# Patient Record
Sex: FEMALE | Race: WHITE | Employment: OTHER | URBAN - METROPOLITAN AREA
[De-identification: names, ages, dates, MRNs, and addresses within clinical notes are randomized per-mention and may not be internally consistent; named-entity substitution may affect disease eponyms.]

---

## 2017-01-02 ENCOUNTER — OFFICE VISIT (OUTPATIENT)
Dept: SLEEP CENTER | Facility: HOSPITAL | Age: 57
End: 2017-01-02
Attending: INTERNAL MEDICINE
Payer: MEDICAID

## 2017-01-02 PROCEDURE — 95810 POLYSOM 6/> YRS 4/> PARAM: CPT

## 2017-01-04 ENCOUNTER — OFFICE VISIT (OUTPATIENT)
Dept: NEUROLOGY | Facility: CLINIC | Age: 57
End: 2017-01-04

## 2017-01-04 VITALS
DIASTOLIC BLOOD PRESSURE: 80 MMHG | RESPIRATION RATE: 20 BRPM | BODY MASS INDEX: 41.95 KG/M2 | WEIGHT: 293 LBS | SYSTOLIC BLOOD PRESSURE: 120 MMHG | HEIGHT: 70 IN | HEART RATE: 100 BPM

## 2017-01-04 DIAGNOSIS — M79.7 FIBROMYALGIA: ICD-10-CM

## 2017-01-04 DIAGNOSIS — G62.9 PERIPHERAL POLYNEUROPATHY: Primary | ICD-10-CM

## 2017-01-04 DIAGNOSIS — G25.81 RESTLESS LEG SYNDROME: ICD-10-CM

## 2017-01-04 PROCEDURE — 99213 OFFICE O/P EST LOW 20 MIN: CPT | Performed by: OTHER

## 2017-01-04 RX ORDER — MELOXICAM 15 MG/1
TABLET ORAL
Refills: 0 | COMMUNITY
Start: 2016-11-14 | End: 2017-09-17

## 2017-01-04 RX ORDER — HYDROXYCHLOROQUINE SULFATE 200 MG/1
TABLET, FILM COATED ORAL
Refills: 0 | COMMUNITY
Start: 2016-10-29 | End: 2017-08-28 | Stop reason: ALTCHOICE

## 2017-01-04 NOTE — PROGRESS NOTES
HPI:    Patient ID: Eleonora Blas is a 49year old female. Numbness  Associated symptoms include numbness. Patient presented for follow-up for peripheral sensory neuropathy, carpal tunnel syndrome and restless leg syndrome.  She states h every evening.  Disp:  Rfl:      Allergies:  Amoxicillin             Anaphylaxis  Penicillins             Anaphylaxis  Adhesive Tape               Comment:Except paper tape and surgical tape   PHYSICAL EXAM:   Physical Exam     Blood pressure 120/80, pulse

## 2017-01-04 NOTE — PATIENT INSTRUCTIONS
Refill policies:    • Allow 2 business days for refills; controlled substances may take longer.   • Contact your pharmacy at least 5 days prior to running out of medication and have them send an electronic request or submit request through the “request re your physician has recommended that you have a procedure or additional testing performed. [facility]) will contact your insurance carrier to obtain pre-certification or prior authorization.     Unfortunately, NIGHAT has seen an increas

## 2017-01-06 NOTE — PROCEDURES
[address] [address],[facility] [address]       Accredited by the [facility] of Sleep Medicine (AASM)    PATIENT'S NAME:        Daxa Rodriguez, [address] PHYSICIAN:   Nneka Chowdary M.D.   REFERRING PHYSICIAN:   Yesenia Castellanos stage 1 or light sleep was seen in normal amounts comprising 5.9% of sleep. Slow-wave sleep was seen in normal amounts comprising 19% of sleep. REM sleep was seen in increased amounts comprising 34.8% of sleep.     RESPIRATORY MEASURES:  Respiratory monit

## 2017-02-13 DIAGNOSIS — M79.7 FIBROMYALGIA: Primary | ICD-10-CM

## 2017-02-13 NOTE — TELEPHONE ENCOUNTER
Medication: Gapabentin 400mg     Date of last refill: 8/16/16  [place]     Last office visit: 1/4/17  Due back to clinic per last office note:  6 months  Date next office visit scheduled:  No appointment    Last OV note

## 2017-02-14 RX ORDER — GABAPENTIN 400 MG/1
CAPSULE ORAL
Qty: 120 CAPSULE | Refills: 4 | Status: SHIPPED | OUTPATIENT
Start: 2017-02-14 | End: 2017-07-20

## 2017-03-02 ENCOUNTER — TELEPHONE (OUTPATIENT)
Dept: FAMILY MEDICINE CLINIC | Facility: CLINIC | Age: 57
End: 2017-03-02

## 2017-03-24 RX ORDER — DIAZEPAM 5 MG/1
TABLET ORAL
Qty: 2 TABLET | Refills: 0 | Status: SHIPPED | OUTPATIENT
Start: 2017-03-24 | End: 2017-08-28 | Stop reason: ALTCHOICE

## 2017-04-17 RX ORDER — PROPRANOLOL HYDROCHLORIDE 60 MG/1
TABLET ORAL
Qty: 180 TABLET | Refills: 1 | Status: SHIPPED | OUTPATIENT
Start: 2017-04-17 | End: 2017-10-19

## 2017-06-16 ENCOUNTER — MED REC SCAN ONLY (OUTPATIENT)
Dept: FAMILY MEDICINE CLINIC | Facility: CLINIC | Age: 57
End: 2017-06-16

## 2017-06-20 ENCOUNTER — TELEPHONE (OUTPATIENT)
Dept: NEUROLOGY | Facility: CLINIC | Age: 57
End: 2017-06-20

## 2017-06-20 NOTE — TELEPHONE ENCOUNTER
Rec'd incoming fax from [facility] dated 6/19/17 notifying office of approval of PA for Gabapentin Cap 400mg, effective 06/18/2017 thorough 06/18/2018.   [facility] #[id number]; [place] #[id number]; Qty/Days supply approved:

## 2017-06-21 ENCOUNTER — TELEPHONE (OUTPATIENT)
Dept: FAMILY MEDICINE CLINIC | Facility: CLINIC | Age: 57
End: 2017-06-21

## 2017-06-21 DIAGNOSIS — M15.9 PRIMARY OSTEOARTHRITIS INVOLVING MULTIPLE JOINTS: Primary | ICD-10-CM

## 2017-06-21 NOTE — TELEPHONE ENCOUNTER
Noted, orders were received. Original copy in [employer]. Orders entered into Epic. Results to be faxed to [phone].

## 2017-06-27 ENCOUNTER — NURSE ONLY (OUTPATIENT)
Dept: FAMILY MEDICINE CLINIC | Facility: CLINIC | Age: 57
End: 2017-06-27

## 2017-06-27 DIAGNOSIS — M15.9 PRIMARY OSTEOARTHRITIS INVOLVING MULTIPLE JOINTS: ICD-10-CM

## 2017-06-27 PROCEDURE — 84460 ALANINE AMINO (ALT) (SGPT): CPT | Performed by: FAMILY MEDICINE

## 2017-06-27 PROCEDURE — 82565 ASSAY OF CREATININE: CPT | Performed by: FAMILY MEDICINE

## 2017-06-27 PROCEDURE — 85025 COMPLETE CBC W/AUTO DIFF WBC: CPT | Performed by: FAMILY MEDICINE

## 2017-06-27 PROCEDURE — 84450 TRANSFERASE (AST) (SGOT): CPT | Performed by: FAMILY MEDICINE

## 2017-06-27 NOTE — PROGRESS NOTES
Mint and Lav tubes drawn from left arm with straight needle x1.  Pt cinthia well    Pt had thought that there was supposed to be a thyroid lab ordered I explained to her that we didn't get that- she states that it was supposed to come from [facility]- advised that

## 2017-07-20 DIAGNOSIS — M79.7 FIBROMYALGIA: ICD-10-CM

## 2017-07-20 RX ORDER — GABAPENTIN 400 MG/1
CAPSULE ORAL
Qty: 120 CAPSULE | Refills: 0 | Status: SHIPPED | OUTPATIENT
Start: 2017-07-20 | End: 2017-08-23

## 2017-07-20 NOTE — TELEPHONE ENCOUNTER
Patient informed  Needs appointment for further refills.   Medication: Gabapentin    Date of last refill: 2/14/17  [place]    Last office visit: 1/4/17  Due back to clinic per last office note:  6 months  Date next off

## 2017-08-08 ENCOUNTER — TELEPHONE (OUTPATIENT)
Dept: FAMILY MEDICINE CLINIC | Facility: CLINIC | Age: 57
End: 2017-08-08

## 2017-08-08 DIAGNOSIS — M25.561 PAIN IN BOTH KNEES, UNSPECIFIED CHRONICITY: ICD-10-CM

## 2017-08-08 DIAGNOSIS — M25.562 PAIN IN BOTH KNEES, UNSPECIFIED CHRONICITY: ICD-10-CM

## 2017-08-08 DIAGNOSIS — G56.00 CARPAL TUNNEL SYNDROME, UNSPECIFIED LATERALITY: Primary | ICD-10-CM

## 2017-08-08 NOTE — TELEPHONE ENCOUNTER
Pt needs referrals for Ortho, Virgil Thorne for hands. and Ortho, Dr. Colonel Browne for knee.  Will be having surgery on both

## 2017-08-08 NOTE — TELEPHONE ENCOUNTER
Would need an office visit to order testing, but it sounds like she is seeing ortho already for her knee-MRI orders or further testing would come from them.  Referrals signed

## 2017-08-08 NOTE — TELEPHONE ENCOUNTER
Pt needs referrals for the specialists- she is also requesting an MRI if the right knee. I asked if this something that she spoke with Dr. Serena Garcia about and she states that they talked about it for her social security paperwork a couple years ago.   Advised th

## 2017-08-09 NOTE — TELEPHONE ENCOUNTER
Referrals faxed,  Pt advised that the referrals have been faxed and that she needs an appt for the MRI or Dr. Verenice Doran can order it.  She v/u

## 2017-08-23 DIAGNOSIS — M79.7 FIBROMYALGIA: ICD-10-CM

## 2017-08-23 NOTE — TELEPHONE ENCOUNTER
Medication: Gabapentin 400 mg     Date of last refill: 7/20/17  [place]     Last office visit: 1/4/17  Due back to clinic per last office note:  6 months  Date next office visit scheduled:  8/29/17     Last OV note

## 2017-08-24 RX ORDER — GABAPENTIN 400 MG/1
CAPSULE ORAL
Qty: 120 CAPSULE | Refills: 0 | Status: SHIPPED | OUTPATIENT
Start: 2017-08-24 | End: 2017-09-24

## 2017-08-28 ENCOUNTER — OFFICE VISIT (OUTPATIENT)
Dept: FAMILY MEDICINE CLINIC | Facility: CLINIC | Age: 57
End: 2017-08-28

## 2017-08-28 ENCOUNTER — TELEPHONE (OUTPATIENT)
Dept: FAMILY MEDICINE CLINIC | Facility: CLINIC | Age: 57
End: 2017-08-28

## 2017-08-28 ENCOUNTER — TELEPHONE (OUTPATIENT)
Dept: NEUROLOGY | Facility: CLINIC | Age: 57
End: 2017-08-28

## 2017-08-28 VITALS
HEART RATE: 76 BPM | DIASTOLIC BLOOD PRESSURE: 64 MMHG | BODY MASS INDEX: 45 KG/M2 | TEMPERATURE: 97 F | RESPIRATION RATE: 12 BRPM | WEIGHT: 293 LBS | SYSTOLIC BLOOD PRESSURE: 100 MMHG

## 2017-08-28 DIAGNOSIS — L30.8 OTHER ECZEMA: ICD-10-CM

## 2017-08-28 DIAGNOSIS — M25.562 CHRONIC PAIN OF LEFT KNEE: ICD-10-CM

## 2017-08-28 DIAGNOSIS — F43.23 ADJUSTMENT DISORDER WITH MIXED ANXIETY AND DEPRESSED MOOD: ICD-10-CM

## 2017-08-28 DIAGNOSIS — M79.7 FIBROMYALGIA: ICD-10-CM

## 2017-08-28 DIAGNOSIS — Z13.220 LIPID SCREENING: ICD-10-CM

## 2017-08-28 DIAGNOSIS — F43.12 CHRONIC POST-TRAUMATIC STRESS DISORDER (PTSD): ICD-10-CM

## 2017-08-28 DIAGNOSIS — N83.201 CYSTS OF BOTH OVARIES: ICD-10-CM

## 2017-08-28 DIAGNOSIS — R73.9 ELEVATED BLOOD SUGAR: ICD-10-CM

## 2017-08-28 DIAGNOSIS — I34.1 MITRAL VALVE PROLAPSE: ICD-10-CM

## 2017-08-28 DIAGNOSIS — Z13.29 THYROID DISORDER SCREEN: ICD-10-CM

## 2017-08-28 DIAGNOSIS — M47.812 OSTEOARTHRITIS OF CERVICAL SPINE, UNSPECIFIED SPINAL OSTEOARTHRITIS COMPLICATION STATUS: ICD-10-CM

## 2017-08-28 DIAGNOSIS — E55.9 VITAMIN D DEFICIENCY: ICD-10-CM

## 2017-08-28 DIAGNOSIS — M51.36 DDD (DEGENERATIVE DISC DISEASE), LUMBAR: ICD-10-CM

## 2017-08-28 DIAGNOSIS — Z12.31 ENCOUNTER FOR SCREENING MAMMOGRAM FOR BREAST CANCER: ICD-10-CM

## 2017-08-28 DIAGNOSIS — N83.202 CYSTS OF BOTH OVARIES: ICD-10-CM

## 2017-08-28 DIAGNOSIS — Z00.00 ENCOUNTER FOR ANNUAL HEALTH EXAMINATION: Primary | ICD-10-CM

## 2017-08-28 DIAGNOSIS — G89.29 CHRONIC PAIN OF LEFT KNEE: ICD-10-CM

## 2017-08-28 DIAGNOSIS — E53.8 VITAMIN B12 DEFICIENCY: ICD-10-CM

## 2017-08-28 DIAGNOSIS — G56.03 BILATERAL CARPAL TUNNEL SYNDROME: ICD-10-CM

## 2017-08-28 PROCEDURE — 82306 VITAMIN D 25 HYDROXY: CPT | Performed by: FAMILY MEDICINE

## 2017-08-28 PROCEDURE — 80048 BASIC METABOLIC PNL TOTAL CA: CPT | Performed by: FAMILY MEDICINE

## 2017-08-28 PROCEDURE — 84439 ASSAY OF FREE THYROXINE: CPT | Performed by: FAMILY MEDICINE

## 2017-08-28 PROCEDURE — 83036 HEMOGLOBIN GLYCOSYLATED A1C: CPT | Performed by: FAMILY MEDICINE

## 2017-08-28 PROCEDURE — G0402 INITIAL PREVENTIVE EXAM: HCPCS | Performed by: FAMILY MEDICINE

## 2017-08-28 PROCEDURE — 84443 ASSAY THYROID STIM HORMONE: CPT | Performed by: FAMILY MEDICINE

## 2017-08-28 PROCEDURE — 82607 VITAMIN B-12: CPT | Performed by: FAMILY MEDICINE

## 2017-08-28 PROCEDURE — 80061 LIPID PANEL: CPT | Performed by: FAMILY MEDICINE

## 2017-08-28 NOTE — TELEPHONE ENCOUNTER
Patient called at 3:50 pm and advised that she is stuck in traffic and will be about 10 min late. Advised that she is already 10 min late. Patient then stated that she is [facility] and will be here in 10 minutes.  ADvised that I will let RN know but we demar

## 2017-08-28 NOTE — PROGRESS NOTES
HPI:   Troy Everett is a 56year old female who presents for a Medicare Subsequent Annual Wellness visit (Pt already had Initial Annual Wellness). Dr. Dominick Manriquez is her neurologist for neuropathy.   Dr. Madison Lewis is her rheumatologist (also lumbar     Carpal tunnel syndrome     Chronic pain of left knee      Last Cholesterol Labs:     Lab Results  Component Value Date   CHOLEST 192 08/28/2017   HDL 43 (L) 08/28/2017    08/28/2017   TRIG 184 (H) 08/28/2017          Last Chemistry Labs: surgery; cholecystectomy; lumpectomy left; lumpectomy left; and lumpectomy right. Her family history includes Breast Cancer in her mother. SOCIAL HISTORY:   She  reports that she quit smoking about 10 years ago.  She has never used smokeless tobacco. S normal, no drainage or sinus tenderness   Throat: Lips, mucosa, and tongue normal; teeth and gums normal   Neck: Supple, symmetrical, trachea midline, no adenopathy;  thyroid: not enlarged, symmetric, no tenderness/mass/nodules; no carotid bruit or JVD breast cancer  -     WILFERDO SCREENING BILAT (CPT=77067); Future    Cysts of both ovaries  Comments:  history of cysts, looking to see if resolved  Orders:  -     US PELVIS EV (TRNS ABD AND ENDOVAG) (BRU=[id number]);  Future    Fibromyalgia  -cont f/u with rhe Pneumococcal?: No     Functional Ability     Bathing or Showering: Able without help    Toileting: Able without help    Dressing: Able without help    Eating: Able without help    Driving: Able without help    Preparing your meals: Able without help    Man sheet to patient  PREVENTATIVE SERVICES  INDICATIONS AND SCHEDULE Internal Lab or Procedure External Lab or Procedure   Diabetes Screening      HbgA1C   Annually   Lab Results  Component Value Date   A1C 6.2 (H) 08/28/2017    No flowsheet data found.     Fa found Please get once after your 65th birthday    Pneumococcal 23 (Pneumovax)  Covered Once after 65 No vaccine history found Please get once after your 65th birthday    Hepatitis B for Moderate/High Risk No vaccine history found Medium/high risk factors: flowsheet data found. No flowsheet data found. COPD      Spirometry Testing Annually Spirometry date:  No flowsheet data found.          Template: LEATHA BLANCO MEDICARE ANNUAL ASSESSMENT FEMALE [75197]

## 2017-08-29 ENCOUNTER — TELEPHONE (OUTPATIENT)
Dept: FAMILY MEDICINE CLINIC | Facility: CLINIC | Age: 57
End: 2017-08-29

## 2017-08-29 LAB
25-HYDROXYVITAMIN D (TOTAL): 13.6 NG/ML (ref 30–100)
BUN BLD-MCNC: 22 MG/DL (ref 8–20)
CALCIUM BLD-MCNC: 9.2 MG/DL (ref 8.3–10.3)
CHLORIDE: 107 MMOL/L (ref 101–111)
CHOLEST SMN-MCNC: 192 MG/DL (ref ?–200)
CO2: 30 MMOL/L (ref 22–32)
CREAT BLD-MCNC: 1.04 MG/DL (ref 0.55–1.02)
EST. AVERAGE GLUCOSE BLD GHB EST-MCNC: 131 MG/DL (ref 68–126)
FREE T4: 0.8 NG/DL (ref 0.9–1.8)
GLUCOSE BLD-MCNC: 85 MG/DL (ref 70–99)
HAV AB SERPL IA-ACNC: >2000 PG/ML (ref 193–986)
HBA1C MFR BLD HPLC: 6.2 % (ref ?–5.7)
HDLC SERPL-MCNC: 43 MG/DL (ref 45–?)
HDLC SERPL: 4.47 {RATIO} (ref ?–4.44)
LDLC SERPL CALC-MCNC: 112 MG/DL (ref ?–130)
LDLC SERPL-MCNC: 37 MG/DL (ref 5–40)
NONHDLC SERPL-MCNC: 149 MG/DL (ref ?–130)
POTASSIUM SERPL-SCNC: 5.2 MMOL/L (ref 3.6–5.1)
SODIUM SERPL-SCNC: 140 MMOL/L (ref 136–144)
TRIGLYCERIDES: 184 MG/DL (ref ?–150)
TSI SER-ACNC: 3.33 MIU/ML (ref 0.35–5.5)

## 2017-08-29 NOTE — TELEPHONE ENCOUNTER
Called the sleep lab and asked about this pt and who would be calling the pt to follow up for the additional testing. The  states that it would be the pulmonologist that would arrange the additional testing.     I called Dr. Delaney Jain office and

## 2017-08-29 NOTE — TELEPHONE ENCOUNTER
----- Message from Genny Walker MD sent at 8/28/2017  5:18 PM CDT -----  Regarding: sleep study f/u testing  Pt told me in our office visit that she had a sleep study ordered by her neuro and never got the results.  I was able to look it up and it showed s

## 2017-08-30 ENCOUNTER — TELEPHONE (OUTPATIENT)
Dept: FAMILY MEDICINE CLINIC | Facility: CLINIC | Age: 57
End: 2017-08-30

## 2017-08-30 NOTE — TELEPHONE ENCOUNTER
----- Message from Mildred Mitchell MD sent at 8/30/2017  7:23 AM CDT -----  Please notify pt her cholesterol, thyroid, blood counts, kidneys, liver, electrolytes all look good.  Her blood sugar is still in the prediabetic range but stable over the past few ye

## 2017-08-31 PROBLEM — G89.29 CHRONIC PAIN OF LEFT KNEE: Status: ACTIVE | Noted: 2017-08-31

## 2017-08-31 PROBLEM — M25.562 CHRONIC PAIN OF LEFT KNEE: Status: ACTIVE | Noted: 2017-08-31

## 2017-09-01 ENCOUNTER — TELEPHONE (OUTPATIENT)
Dept: FAMILY MEDICINE CLINIC | Facility: CLINIC | Age: 57
End: 2017-09-01

## 2017-09-01 NOTE — TELEPHONE ENCOUNTER
Can I assume this means he was able to visualize the area where the fracture was and it is healed? Or does this mean he was unable to visualize that area at all so unable to compare?  IF he visualized the area and fracture healed then please notify pt of th

## 2017-09-01 NOTE — TELEPHONE ENCOUNTER
Called Radiology at [facility] to see if Dr. Yao Leahy could compare the MRI from 6/13/13 to the one from 3/27/17 to see if he can see the L proximal fibula fracture.   Spoke with Marina Anderson and she had Dr. Yao Leahy compare the 2 and he can not see the fracture on the MRI

## 2017-09-02 NOTE — TELEPHONE ENCOUNTER
please notify pt of this, that we don't need to do any further imaging for that fracture (pt had asked me about repeat imaging to make sure the fracture was healed) thanks

## 2017-09-02 NOTE — TELEPHONE ENCOUNTER
Yasemin Engel at [facility]. Transferred to Dr. Ina Garcia. States that he did see area where fracture was and it is healed.

## 2017-09-05 ENCOUNTER — MED REC SCAN ONLY (OUTPATIENT)
Dept: FAMILY MEDICINE CLINIC | Facility: CLINIC | Age: 57
End: 2017-09-05

## 2017-09-15 ENCOUNTER — TELEPHONE (OUTPATIENT)
Dept: FAMILY MEDICINE CLINIC | Facility: CLINIC | Age: 57
End: 2017-09-15

## 2017-09-15 NOTE — TELEPHONE ENCOUNTER
LRF we have not filled this for the pt-I called the pt and she states that this was prescribed by kiki grijalva-the pt states that you wanted to take over prescribing the amitriptyline and the meloxicam and she wasn't sure if you were going to order the trama

## 2017-09-15 NOTE — TELEPHONE ENCOUNTER
Pt is now taking 2 AMITRIPTYLINE HCL a day and needs a refill.    [facility] [address] [facility], [address] [facility] [address] [place] [address] Drive, [phone], [phone]

## 2017-09-17 RX ORDER — MELOXICAM 15 MG/1
TABLET ORAL
Qty: 30 TABLET | Refills: 6 | Status: SHIPPED | OUTPATIENT
Start: 2017-09-17 | End: 2018-04-11

## 2017-09-18 NOTE — TELEPHONE ENCOUNTER
I can fill the amitriptyline and meloxicam. I sent the meloxicam but can you please clarify, is she taking 2 of the 50mg amitripytline qhs or just 1?  Thanks

## 2017-09-22 RX ORDER — AMITRIPTYLINE HYDROCHLORIDE 100 MG/1
TABLET, FILM COATED ORAL
Qty: 45 TABLET | Refills: 1 | Status: SHIPPED | OUTPATIENT
Start: 2017-09-22 | End: 2017-11-13

## 2017-09-22 NOTE — TELEPHONE ENCOUNTER
Pt called back and she is taking 2 of the amitriptyline at bedtime- she states that this dose is not helping her much- it is still taking her a long time to fall asleep

## 2017-09-22 NOTE — TELEPHONE ENCOUNTER
So she's taking 100mg qhs, 2 of the 50mg pill?  If so, and she's not having side effects, up it to 150mg nightly and call in a week with an update

## 2017-09-24 DIAGNOSIS — M79.7 FIBROMYALGIA: ICD-10-CM

## 2017-09-25 RX ORDER — GABAPENTIN 400 MG/1
CAPSULE ORAL
Qty: 120 CAPSULE | Refills: 0 | Status: SHIPPED | OUTPATIENT
Start: 2017-09-25 | End: 2017-10-22

## 2017-10-19 RX ORDER — PROPRANOLOL HYDROCHLORIDE 60 MG/1
TABLET ORAL
Qty: 180 TABLET | Refills: 3 | Status: SHIPPED | OUTPATIENT
Start: 2017-10-19 | End: 2018-11-16

## 2017-10-22 DIAGNOSIS — M79.7 FIBROMYALGIA: ICD-10-CM

## 2017-10-23 ENCOUNTER — TELEPHONE (OUTPATIENT)
Dept: FAMILY MEDICINE CLINIC | Facility: CLINIC | Age: 57
End: 2017-10-23

## 2017-10-23 NOTE — TELEPHONE ENCOUNTER
Fax request for tramadol 50mg     oxana PEPE this has been filled by another provider- did you discuss that you would fill this for her?

## 2017-10-23 NOTE — TELEPHONE ENCOUNTER
I don't think I've been managing her pain, I think that's been ortho and/or pain doc?  She needs to stick with one doc

## 2017-10-23 NOTE — TELEPHONE ENCOUNTER
Called the pt and she states that you told her that you would manage the tramaadol because hernán didn't want to manage it anymore

## 2017-10-24 RX ORDER — GABAPENTIN 400 MG/1
CAPSULE ORAL
Qty: 120 CAPSULE | Refills: 0 | Status: SHIPPED | OUTPATIENT
Start: 2017-10-24 | End: 2017-11-21

## 2017-10-24 NOTE — TELEPHONE ENCOUNTER
Okay, but what conditions is she treating patient for, what diagnoses?  Need to know what to follow/manage thanks

## 2017-10-24 NOTE — TELEPHONE ENCOUNTER
Can you please call her office to get the scoop on this--what she was treating, if there was a concern with the tramadol. ..thanks

## 2017-10-24 NOTE — TELEPHONE ENCOUNTER
Called Dr. Darreld Gitelman office [phone]  jerrod and spoke with Koki Tapia and asked about the tramadol - she states that Dr. Kg Atkins is no longer prescribing the tramadol at all -this is an office policy.

## 2017-10-27 ENCOUNTER — OFFICE VISIT (OUTPATIENT)
Dept: NEUROLOGY | Facility: CLINIC | Age: 57
End: 2017-10-27

## 2017-10-27 VITALS — DIASTOLIC BLOOD PRESSURE: 76 MMHG | HEART RATE: 78 BPM | SYSTOLIC BLOOD PRESSURE: 124 MMHG

## 2017-10-27 DIAGNOSIS — G25.81 RESTLESS LEG SYNDROME: ICD-10-CM

## 2017-10-27 DIAGNOSIS — G62.9 PERIPHERAL POLYNEUROPATHY: ICD-10-CM

## 2017-10-27 DIAGNOSIS — G47.33 OBSTRUCTIVE SLEEP APNEA SYNDROME: ICD-10-CM

## 2017-10-27 DIAGNOSIS — S09.90XA TRAUMATIC INJURY OF HEAD, INITIAL ENCOUNTER: Primary | ICD-10-CM

## 2017-10-27 PROCEDURE — 99213 OFFICE O/P EST LOW 20 MIN: CPT | Performed by: OTHER

## 2017-10-27 RX ORDER — TRAMADOL HYDROCHLORIDE 50 MG/1
50 TABLET ORAL EVERY 6 HOURS PRN
COMMUNITY
End: 2017-10-30

## 2017-10-27 RX ORDER — CYCLOBENZAPRINE HCL 10 MG
10 TABLET ORAL 2 TIMES DAILY
COMMUNITY
End: 2018-05-14
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
## 2017-10-27 NOTE — PATIENT INSTRUCTIONS
Refill policies:    • Allow 2-3 business days for refills; controlled substances may take longer.   • Contact your pharmacy at least 5 days prior to running out of medication and have them send an electronic request or submit request through the [facility] have a procedure or additional testing performed. [facility]) will contact your insurance carrier to obtain pre-certification or prior authorization.     Unfortunately, NIGHAT has seen an increase in denial of payment even though the p

## 2017-10-30 RX ORDER — TRAMADOL HYDROCHLORIDE 50 MG/1
50 TABLET ORAL 2 TIMES DAILY PRN
Qty: 60 TABLET | Refills: 0 | Status: SHIPPED
Start: 2017-10-30 | End: 2017-12-16

## 2017-10-30 NOTE — TELEPHONE ENCOUNTER
Called and spoke to [place] all[place] at Dr. Lea Pain office she states they do not prescribe this medication for long term use anymore. Last refill was 8/23/2017 #90 with no refills.   Patient was being treated for Osteoarthritis of both knees and also osteoarthritis o

## 2017-10-30 NOTE — PROGRESS NOTES
HPI:    Patient ID: Macarena Martell is a 56year old female. HPI    Patient presented for follow-up for peripheral sensory neuropathy, carpal tunnel syndrome and restless leg syndrome. States symptoms are getting worse.  She is taking medicatio Alcohol use: Yes           0.0 oz/week     Comment: occasional         Review of Systems   Constitutional: Negative. HENT: Negative. Eyes: Negative. Respiratory: Negative. Cardiovascular: Negative. Gastrointestinal: Negative.     Apolinar Lay Anaphylaxis  Adhesive Tape               Comment:Except paper tape and surgical tape  Cephalosporins          Hives  Codeine                 Hives   PHYSICAL EXAM:   Physical Exam    Blood pressure 124/76, pulse 78.       General: well developed, well cristian this encounter       Imaging & Referrals:  CT BRAIN OR HEAD ([id number])       LEEROY#[id number]

## 2017-11-11 ENCOUNTER — TELEPHONE (OUTPATIENT)
Dept: FAMILY MEDICINE CLINIC | Facility: CLINIC | Age: 57
End: 2017-11-11

## 2017-11-11 NOTE — TELEPHONE ENCOUNTER
Patient is having a CT Scan done on Monday and wants to know if [facility] will prescribe something for her to relax her because she has to go into the tube.      [facility] [place] ON [address].

## 2017-11-11 NOTE — TELEPHONE ENCOUNTER
Patient notified that CT scan is open as previous MRI was when she was\"in the tube. \"  Patient verbalized understanding.

## 2017-11-13 RX ORDER — AMITRIPTYLINE HYDROCHLORIDE 100 MG/1
TABLET, FILM COATED ORAL
Qty: 45 TABLET | Refills: 1 | Status: SHIPPED | OUTPATIENT
Start: 2017-11-13 | End: 2018-05-13

## 2017-11-21 DIAGNOSIS — M79.7 FIBROMYALGIA: ICD-10-CM

## 2017-11-21 RX ORDER — GABAPENTIN 400 MG/1
CAPSULE ORAL
Qty: 120 CAPSULE | Refills: 0 | Status: SHIPPED | OUTPATIENT
Start: 2017-11-21 | End: 2017-12-18

## 2017-11-24 ENCOUNTER — HOSPITAL ENCOUNTER (OUTPATIENT)
Dept: CT IMAGING | Age: 57
Discharge: HOME | End: 2017-11-24
Attending: Other
Payer: MEDICARE

## 2017-11-24 ENCOUNTER — HOSPITAL ENCOUNTER (OUTPATIENT)
Dept: CV DIAGNOSTICS | Facility: HOSPITAL | Age: 57
End: 2017-11-24
Attending: FAMILY MEDICINE
Payer: MEDICARE

## 2017-11-24 DIAGNOSIS — S09.90XA TRAUMATIC INJURY OF HEAD, INITIAL ENCOUNTER: ICD-10-CM

## 2017-11-24 PROCEDURE — 70450 CT HEAD/BRAIN W/O DYE: CPT | Performed by: OTHER

## 2017-11-27 ENCOUNTER — HOSPITAL ENCOUNTER (OUTPATIENT)
Dept: MAMMOGRAPHY | Age: 57
Discharge: HOME | End: 2017-11-27
Attending: FAMILY MEDICINE
Payer: MEDICARE

## 2017-11-27 DIAGNOSIS — Z12.31 ENCOUNTER FOR SCREENING MAMMOGRAM FOR BREAST CANCER: ICD-10-CM

## 2017-11-27 PROCEDURE — 77067 SCR MAMMO BI INCL CAD: CPT | Performed by: FAMILY MEDICINE

## 2017-11-29 ENCOUNTER — TELEPHONE (OUTPATIENT)
Dept: NEUROLOGY | Facility: CLINIC | Age: 57
End: 2017-11-29

## 2017-11-29 ENCOUNTER — HOSPITAL ENCOUNTER (OUTPATIENT)
Dept: ULTRASOUND IMAGING | Facility: HOSPITAL | Age: 57
Discharge: HOME | End: 2017-11-29
Attending: FAMILY MEDICINE
Payer: MEDICARE

## 2017-11-29 ENCOUNTER — OFFICE VISIT (OUTPATIENT)
Dept: NEUROLOGY | Facility: CLINIC | Age: 57
End: 2017-11-29

## 2017-11-29 VITALS
BODY MASS INDEX: 41.95 KG/M2 | DIASTOLIC BLOOD PRESSURE: 86 MMHG | SYSTOLIC BLOOD PRESSURE: 124 MMHG | HEIGHT: 70 IN | WEIGHT: 293 LBS | HEART RATE: 77 BPM | RESPIRATION RATE: 16 BRPM

## 2017-11-29 DIAGNOSIS — M79.7 FIBROMYALGIA: ICD-10-CM

## 2017-11-29 DIAGNOSIS — G62.9 PERIPHERAL POLYNEUROPATHY: Primary | ICD-10-CM

## 2017-11-29 DIAGNOSIS — G25.81 RESTLESS LEG SYNDROME: ICD-10-CM

## 2017-11-29 DIAGNOSIS — N83.201 CYSTS OF BOTH OVARIES: ICD-10-CM

## 2017-11-29 DIAGNOSIS — N83.202 CYSTS OF BOTH OVARIES: ICD-10-CM

## 2017-11-29 PROCEDURE — 76830 TRANSVAGINAL US NON-OB: CPT | Performed by: FAMILY MEDICINE

## 2017-11-29 PROCEDURE — 76856 US EXAM PELVIC COMPLETE: CPT | Performed by: FAMILY MEDICINE

## 2017-11-29 PROCEDURE — 99213 OFFICE O/P EST LOW 20 MIN: CPT | Performed by: OTHER

## 2017-11-29 RX ORDER — AMITRIPTYLINE HYDROCHLORIDE 50 MG/1
TABLET, FILM COATED ORAL
Refills: 0 | COMMUNITY
Start: 2017-10-26 | End: 2018-05-14

## 2017-11-29 NOTE — PATIENT INSTRUCTIONS
Refill policies:    • Allow 2-3 business days for refills; controlled substances may take longer.   • Contact your pharmacy at least 5 days prior to running out of medication and have them send an electronic request or submit request through the [facility] have a procedure or additional testing performed. [facility]) will contact your insurance carrier to obtain pre-certification or prior authorization.     Unfortunately, NIGHAT has seen an increase in denial of payment even though the p

## 2017-11-29 NOTE — TELEPHONE ENCOUNTER
Spoke with patient and relayed test results per Dr. Greer Lamb. Pt verbalized understanding. Answered all questions and pt was encouraged to call office with any additional questions or concerns.

## 2017-12-16 RX ORDER — TRAMADOL HYDROCHLORIDE 50 MG/1
TABLET ORAL
Qty: 60 TABLET | Refills: 0 | OUTPATIENT
Start: 2017-12-16 | End: 2018-01-13

## 2017-12-16 NOTE — TELEPHONE ENCOUNTER
LOV: 8/28/17  Last Refill:10/30/17 #60 0 RF    Future Appointments  Date Time Provider Mago Gonzalez   1/10/2018 10:00 PM SCHEDULE BY DATE [address] Ale Calvillo   3/28/2018 3:20 PM Teja Araujo MD [facility] Rakel Velasquez, 12/16

## 2017-12-18 DIAGNOSIS — M79.7 FIBROMYALGIA: ICD-10-CM

## 2017-12-18 RX ORDER — GABAPENTIN 400 MG/1
CAPSULE ORAL
Qty: 120 CAPSULE | Refills: 2 | Status: SHIPPED | OUTPATIENT
Start: 2017-12-18 | End: 2018-03-28

## 2017-12-18 NOTE — TELEPHONE ENCOUNTER
Medication: Gabapentin     Date of last refill: 11/21/17 for #120/0 additional refills  [place]    Last office visit: 11/29/17  Due back to clinic per last office note:  4 months  Date next office visit scheduled:  3/28/18    Last OV note recommendation: not available, note not completed.

## 2018-01-15 RX ORDER — TRAMADOL HYDROCHLORIDE 50 MG/1
TABLET ORAL
Qty: 60 TABLET | Refills: 0 | Status: SHIPPED
Start: 2018-01-15 | End: 2018-02-13

## 2018-01-21 NOTE — PROGRESS NOTES
HPI:    Patient ID: Blayne Gallego is a 56year old female. HPI      Patient presented for follow-up for peripheral sensory neuropathy, carpal tunnel syndrome and restless leg syndrome. States symptoms remains the same.  She is taking medication Negative. Musculoskeletal: Positive for arthralgias and gait problem. Allergic/Immunologic: Negative. Neurological: Positive for numbness. Hematological: Negative. Psychiatric/Behavioral: Positive for sleep disturbance.    All other systems rev resp. rate 16, height 70\", weight (!) 313 lb. General: well developed, well nourished, obese  HEENT: Normocephalic and atraumatic. Point tenderness in the left temporal area   Cardiovascular: Normal rate, regular rhythm and normal heart sounds.    Pul

## 2018-01-23 ENCOUNTER — OFFICE VISIT (OUTPATIENT)
Dept: SLEEP CENTER | Facility: HOSPITAL | Age: 58
End: 2018-01-23
Attending: INTERNAL MEDICINE
Payer: MEDICARE

## 2018-01-23 PROCEDURE — 95811 POLYSOM 6/>YRS CPAP 4/> PARM: CPT

## 2018-01-25 ENCOUNTER — TELEPHONE (OUTPATIENT)
Dept: FAMILY MEDICINE CLINIC | Facility: CLINIC | Age: 58
End: 2018-01-25

## 2018-01-25 NOTE — TELEPHONE ENCOUNTER
Pt fell early Wed am and hurt both knees and right wrist. Pt is supposed to go to water therapy [facility]. Pt is pretty sure she needs xray. Asked for return call.

## 2018-01-30 NOTE — PROCEDURES
[address] [address]       Accredited by the [facility] of Sleep Medicine (AASM)    PATIENT'S NAME:        Nakia Gutierrez  ATTENDING PHYSICIAN:   Kash Pruitt M.D.   REFERRING PHYSICIAN:   Kash Pruitt M.D. follows. Stage 1, 4%; stage 2, 33%; stage 3, 51%; stage REM 12%. RESPIRATORY MEASURES:  The patient was initiated on CPAP at 5 cm of water and titrated up to a final pressure of 10 cm of water.   On this setting, patient had an overall apnea-hypopnea i

## 2018-02-01 RX ORDER — TRAMADOL HYDROCHLORIDE 50 MG/1
TABLET ORAL
Qty: 60 TABLET | Refills: 0 | OUTPATIENT
Start: 2018-02-01

## 2018-02-01 NOTE — TELEPHONE ENCOUNTER
Last OV 8/28/17  Last refilled 1/1/18  Confirmed with pharmacy Tramadol #60 last dispensed 1/17/18    Refill denied

## 2018-02-13 RX ORDER — TRAMADOL HYDROCHLORIDE 50 MG/1
TABLET ORAL
Qty: 60 TABLET | Refills: 0 | Status: SHIPPED
Start: 2018-02-13 | End: 2018-05-13

## 2018-03-26 DIAGNOSIS — M79.7 FIBROMYALGIA: ICD-10-CM

## 2018-03-26 RX ORDER — GABAPENTIN 400 MG/1
CAPSULE ORAL
Qty: 120 CAPSULE | Refills: 0 | Status: CANCELLED | OUTPATIENT
Start: 2018-03-26

## 2018-03-28 ENCOUNTER — OFFICE VISIT (OUTPATIENT)
Dept: NEUROLOGY | Facility: CLINIC | Age: 58
End: 2018-03-28

## 2018-03-28 VITALS
WEIGHT: 293 LBS | SYSTOLIC BLOOD PRESSURE: 134 MMHG | RESPIRATION RATE: 18 BRPM | HEART RATE: 96 BPM | BODY MASS INDEX: 41.95 KG/M2 | HEIGHT: 70 IN | DIASTOLIC BLOOD PRESSURE: 94 MMHG

## 2018-03-28 DIAGNOSIS — G60.8 PERIPHERAL SENSORY NEUROPATHY: Primary | ICD-10-CM

## 2018-03-28 DIAGNOSIS — G25.81 RESTLESS LEG SYNDROME: ICD-10-CM

## 2018-03-28 DIAGNOSIS — M79.7 FIBROMYALGIA: ICD-10-CM

## 2018-03-28 PROCEDURE — 99213 OFFICE O/P EST LOW 20 MIN: CPT | Performed by: OTHER

## 2018-03-28 RX ORDER — GABAPENTIN 400 MG/1
CAPSULE ORAL
Qty: 120 CAPSULE | Refills: 2 | Status: SHIPPED | OUTPATIENT
Start: 2018-03-28 | End: 2018-11-11

## 2018-03-28 NOTE — PATIENT INSTRUCTIONS
Refill policies:    • Allow 2-3 business days for refills; controlled substances may take longer.   • Contact your pharmacy at least 5 days prior to running out of medication and have them send an electronic request or submit request through the [facility] for the entire amount billed. Precertification and Prior Authorizations  If your physician has recommended that you have a procedure or additional testing performed.   [facility] ([facility]IGHAT) will contact your insurance carrier to obtain pr

## 2018-04-02 NOTE — PROGRESS NOTES
HPI:    Patient ID: Natasha Moran is a 56year old female. HPI      Patient presented for follow-up for peripheral sensory neuropathy, carpal tunnel syndrome and restless leg syndrome. She has been feeling slight better overall.  Started using Negative. Musculoskeletal: Positive for arthralgias and gait problem. Allergic/Immunologic: Negative. Neurological: Positive for numbness. Hematological: Negative. Psychiatric/Behavioral: Positive for sleep disturbance.    All other systems rev nourished, obese  HEENT: Normocephalic and atraumatic. Point tenderness in the left temporal area   Cardiovascular: Normal rate, regular rhythm and normal heart sounds. Pulmonary/Chest: Effort normal and breath sounds normal.   Abdominal: Soft.  Bowel tyson

## 2018-04-10 ENCOUNTER — TELEPHONE (OUTPATIENT)
Dept: FAMILY MEDICINE CLINIC | Facility: CLINIC | Age: 58
End: 2018-04-10

## 2018-04-10 NOTE — TELEPHONE ENCOUNTER
Pt is looking for ortho who will take her ins. The MD at [facility] is not taking the medicare. Does [facility] know of one. Pt also says she owes Micheal Ross an apology.

## 2018-04-10 NOTE — TELEPHONE ENCOUNTER
[facility]  [address]  [place] [address]  Phone [phone]  Fax [phone] the pt and advised of the only office I know that had been taking her insurance- advised to callback if she needs a referral

## 2018-04-11 RX ORDER — MELOXICAM 15 MG/1
TABLET ORAL
Qty: 30 TABLET | Refills: 3 | Status: SHIPPED | OUTPATIENT
Start: 2018-04-11 | End: 2018-08-13

## 2018-04-11 NOTE — TELEPHONE ENCOUNTER
Last refilled on 9/17/17 for # 30 with 6 refills    Last seen on 8/28/17  No future appointments. Thank you.

## 2018-04-18 NOTE — TELEPHONE ENCOUNTER
Spoke with the pt and advised of the notes from Dr. Ruel Jay and I gave her the number to Latosha Garibay if he doesn't contact her first. She v/u

## 2018-04-18 NOTE — TELEPHONE ENCOUNTER
PT CALLED AND WOULD LIKE TO TALK WITH NURSE ABOUT SETTING UP APPT WITH     NAGI,    PT FEELS LIKE SHE REALLY NEEDS TO GET THIS GOING. PT DID ADV SHE IS NOT GOING TO HARM HERSELF AND IS OK. HAS TO DO WITH PHOBIA THAT SHE HAS WITH CPAP MACHINE.     PLE
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
## 2018-04-24 ENCOUNTER — PATIENT OUTREACH (OUTPATIENT)
Dept: FAMILY MEDICINE CLINIC | Facility: CLINIC | Age: 58
End: 2018-04-24

## 2018-05-14 RX ORDER — TRAMADOL HYDROCHLORIDE 50 MG/1
TABLET ORAL
Qty: 60 TABLET | Refills: 0 | Status: SHIPPED
Start: 2018-05-14 | End: 2018-08-29

## 2018-05-14 RX ORDER — AMITRIPTYLINE HYDROCHLORIDE 100 MG/1
TABLET, FILM COATED ORAL
Qty: 45 TABLET | Refills: 6 | Status: SHIPPED | OUTPATIENT
Start: 2018-05-14 | End: 2018-07-11

## 2018-05-14 NOTE — TELEPHONE ENCOUNTER
Tramadol Last refilled on 2/13/18 for # 60 with 0 refills  Amitriptyline refilled 11/13/17 #45 with 1 refill  Triamcinolone refilled 8/28/17 60g with 1 refill  Last seen on 8/28/17  Future Appointments  Date Time Provider Mago Gonzalez   9/10/2018 1:45

## 2018-06-05 ENCOUNTER — TELEPHONE (OUTPATIENT)
Dept: FAMILY MEDICINE CLINIC | Facility: CLINIC | Age: 58
End: 2018-06-05

## 2018-06-05 NOTE — TELEPHONE ENCOUNTER
Fax received from an unknown pharmacy requesting medications on this patient.     Phone [phone], fax [phone]  These are the numbers to this unknown pharmacy    I called the pt to confirm that she did not request these medications from this pharmacy-

## 2018-06-06 ENCOUNTER — TELEPHONE (OUTPATIENT)
Dept: NEUROLOGY | Facility: CLINIC | Age: 58
End: 2018-06-06

## 2018-06-20 ENCOUNTER — TELEPHONE (OUTPATIENT)
Dept: FAMILY MEDICINE CLINIC | Facility: CLINIC | Age: 58
End: 2018-06-20

## 2018-06-20 NOTE — TELEPHONE ENCOUNTER
Pt would like to know if [address] finished the paperwork her daughter dropped off for her last week.    Please return call to [phone]

## 2018-07-10 ENCOUNTER — OFFICE VISIT (OUTPATIENT)
Dept: NEUROLOGY | Facility: CLINIC | Age: 58
End: 2018-07-10

## 2018-07-10 DIAGNOSIS — Z02.9 ADMINISTRATIVE ENCOUNTER: Primary | ICD-10-CM

## 2018-07-11 ENCOUNTER — OFFICE VISIT (OUTPATIENT)
Dept: NEUROLOGY | Facility: CLINIC | Age: 58
End: 2018-07-11

## 2018-07-11 VITALS
WEIGHT: 293 LBS | BODY MASS INDEX: 44 KG/M2 | SYSTOLIC BLOOD PRESSURE: 110 MMHG | DIASTOLIC BLOOD PRESSURE: 70 MMHG | HEART RATE: 80 BPM

## 2018-07-11 DIAGNOSIS — G25.81 RESTLESS LEG SYNDROME: ICD-10-CM

## 2018-07-11 DIAGNOSIS — G60.8 PERIPHERAL SENSORY NEUROPATHY: Primary | ICD-10-CM

## 2018-07-11 PROCEDURE — 99213 OFFICE O/P EST LOW 20 MIN: CPT | Performed by: OTHER

## 2018-07-11 RX ORDER — ROPINIROLE 0.5 MG/1
0.5 TABLET, FILM COATED ORAL NIGHTLY
Qty: 30 TABLET | Refills: 2 | Status: SHIPPED | OUTPATIENT
Start: 2018-07-11 | End: 2018-10-11

## 2018-07-11 RX ORDER — CLINDAMYCIN HYDROCHLORIDE 150 MG/1
CAPSULE ORAL
COMMUNITY
Start: 2018-05-07 | End: 2019-09-30

## 2018-07-11 RX ORDER — AMITRIPTYLINE HYDROCHLORIDE 50 MG/1
50 TABLET, FILM COATED ORAL NIGHTLY
COMMUNITY
Start: 2018-07-03 | End: 2018-12-19 | Stop reason: ALTCHOICE

## 2018-07-11 RX ORDER — CYCLOBENZAPRINE HCL 10 MG
TABLET ORAL
Refills: 2 | COMMUNITY
Start: 2018-06-18 | End: 2018-12-25

## 2018-07-11 NOTE — PROGRESS NOTES
Patient here to follow up for neuropathy. Patient states her neuropathy symptoms are worsening in her right leg. States she has been experiencing side effects to Gabapentin and would like to discuss discontinuing medication with provider.

## 2018-07-11 NOTE — PROGRESS NOTES
HPI:    Patient ID: Sukhjinder Bosch is a 56year old female. HPI      Patient presented for follow-up for peripheral sensory neuropathy, carpal tunnel syndrome and restless leg syndrome. Hx of unspecified autoimmune disease and prediabetes. for numbness. Hematological: Negative. Psychiatric/Behavioral: Positive for sleep disturbance. All other systems reviewed and are negative. Current Outpatient Prescriptions:  Amitriptyline HCl 50 MG Oral Tab Take 50 mg by mouth nightly. Cardiovascular: Normal rate, regular rhythm and normal heart sounds. Pulmonary/Chest: Effort normal and breath sounds normal.   Abdominal: Soft. Bowel sounds are normal.   Skin: Skin is warm and dry. Psychiatric: normal mood and affect.    Neurologica

## 2018-07-11 NOTE — PATIENT INSTRUCTIONS
Elavil    Take 125 mg at bedtime x 5 days, 100 mg at bedtime x 5 days and 50 mg at bedtime x 5 days, 25 mg x 1 week then discontinue.     Requip   Start 0.5 mg at bedtime next week

## 2018-07-30 ENCOUNTER — TELEPHONE (OUTPATIENT)
Dept: FAMILY MEDICINE CLINIC | Facility: CLINIC | Age: 58
End: 2018-07-30

## 2018-07-30 DIAGNOSIS — E55.9 VITAMIN D DEFICIENCY: ICD-10-CM

## 2018-07-30 DIAGNOSIS — M79.7 FIBROMYALGIA: Primary | ICD-10-CM

## 2018-07-30 NOTE — TELEPHONE ENCOUNTER
Orders from Dr. Miley Gould received for ast, alt, cbc, cre  And vit d  Orders entered and sent the originals to scan

## 2018-08-13 RX ORDER — MELOXICAM 15 MG/1
TABLET ORAL
Qty: 30 TABLET | Refills: 3 | Status: SHIPPED | OUTPATIENT
Start: 2018-08-13 | End: 2018-12-19

## 2018-08-13 NOTE — TELEPHONE ENCOUNTER
Last refilled on 4/11/18 for # 30 with 3 refills  Last seen on 8/28/17  Future Appointments  Date Time Provider Mago Jensenisti   9/10/2018 1:45 PM Claudia Irwin MD [facility] Tim Dexter   10/12/2018 1:00 PM Olamide Massey MD [facility]

## 2018-08-29 ENCOUNTER — TELEPHONE (OUTPATIENT)
Dept: FAMILY MEDICINE CLINIC | Facility: CLINIC | Age: 58
End: 2018-08-29

## 2018-08-29 NOTE — TELEPHONE ENCOUNTER
Spoke with the pt and she needs a refill of tramadol she states that she is in pain- she has knee pain.  I advised that Dr. Nam Solano is not in the office this afternoon but I will ask her to address this in the AM- she v/u  She takes the meloxicam once daily

## 2018-08-30 RX ORDER — TRAMADOL HYDROCHLORIDE 50 MG/1
TABLET ORAL
Qty: 60 TABLET | Refills: 0 | Status: SHIPPED
Start: 2018-08-30 | End: 2018-11-16

## 2018-08-30 NOTE — TELEPHONE ENCOUNTER
Script printed for tramadol.   Agree we can further dicsuss neuropathy at upcoming visit and if she has concerns about the plan with her knee she should discus with her ortho

## 2018-09-17 ENCOUNTER — TELEPHONE (OUTPATIENT)
Dept: FAMILY MEDICINE CLINIC | Facility: CLINIC | Age: 58
End: 2018-09-17

## 2018-10-09 ENCOUNTER — TELEPHONE (OUTPATIENT)
Dept: FAMILY MEDICINE CLINIC | Facility: CLINIC | Age: 58
End: 2018-10-09

## 2018-10-09 NOTE — TELEPHONE ENCOUNTER
Pt called to see if she can get in today to see [address] She thinks that the last time when she no-showed her appt she fractured her leg. She slipped getting out of the bathtub. I confirmed the date of 9-17-18 with her and she said yes.    She has not be see

## 2018-10-11 ENCOUNTER — TELEPHONE (OUTPATIENT)
Dept: NEUROLOGY | Facility: CLINIC | Age: 58
End: 2018-10-11

## 2018-10-15 RX ORDER — ROPINIROLE 0.5 MG/1
TABLET, FILM COATED ORAL
Qty: 30 TABLET | Refills: 0 | Status: SHIPPED | OUTPATIENT
Start: 2018-10-15 | End: 2018-11-12

## 2018-10-19 ENCOUNTER — HOSPITAL ENCOUNTER (OUTPATIENT)
Dept: GENERAL RADIOLOGY | Age: 58
Discharge: HOME | End: 2018-10-19
Attending: FAMILY MEDICINE
Payer: MEDICARE

## 2018-10-19 ENCOUNTER — OFFICE VISIT (OUTPATIENT)
Dept: FAMILY MEDICINE CLINIC | Facility: CLINIC | Age: 58
End: 2018-10-19
Payer: MEDICARE

## 2018-10-19 VITALS
HEART RATE: 78 BPM | BODY MASS INDEX: 41.95 KG/M2 | TEMPERATURE: 97 F | RESPIRATION RATE: 18 BRPM | SYSTOLIC BLOOD PRESSURE: 120 MMHG | OXYGEN SATURATION: 96 % | HEIGHT: 70 IN | DIASTOLIC BLOOD PRESSURE: 80 MMHG | WEIGHT: 293 LBS

## 2018-10-19 DIAGNOSIS — M25.469 KNEE SWELLING: ICD-10-CM

## 2018-10-19 DIAGNOSIS — M25.562 ACUTE PAIN OF LEFT KNEE: ICD-10-CM

## 2018-10-19 DIAGNOSIS — M25.562 ACUTE PAIN OF LEFT KNEE: Primary | ICD-10-CM

## 2018-10-19 PROCEDURE — 73590 X-RAY EXAM OF LOWER LEG: CPT | Performed by: FAMILY MEDICINE

## 2018-10-19 PROCEDURE — 99213 OFFICE O/P EST LOW 20 MIN: CPT | Performed by: FAMILY MEDICINE

## 2018-10-19 NOTE — PROGRESS NOTES
Karen Oswald is a 66year old female. HPI:   Patient here to discuss L leg pain.     She reports hurrying to get out of the bathtub a few weeks ago and slipped, started doing the splits, felt pulling in left upper leg, then left shin rammed into REM AHI [address] Nadeem 69%   • Polyneuropathy     UE and LE   • Polyp of colon July 2016   • Unspecified essential hypertension       Past Surgical History:   Procedure Laterality Date   • CHOLECYSTECTOMY     • HERNIA SURGERY      supraumbilical   [place]  -     Cancel: XR KNEE (1 OR 2 VIEWS), LEFT (CPT=73560); Future  -     XR TIBIA + FIBULA (2 VIEWS), LEFT (CPT=73590); Future  -     MRI KNEE (W+WO), LEFT (CPT=73750);  Future    Knee swelling  Comments:  left, medial  Orders:  -     C

## 2018-11-11 DIAGNOSIS — M79.7 FIBROMYALGIA: ICD-10-CM

## 2018-11-12 DIAGNOSIS — G25.81 RESTLESS LEGS SYNDROME (RLS): Primary | ICD-10-CM

## 2018-11-12 RX ORDER — GABAPENTIN 400 MG/1
CAPSULE ORAL
Qty: 120 CAPSULE | Refills: 0 | Status: SHIPPED | OUTPATIENT
Start: 2018-11-12 | End: 2018-12-19

## 2018-11-12 RX ORDER — ROPINIROLE 0.5 MG/1
TABLET, FILM COATED ORAL
Qty: 30 TABLET | Refills: 0 | Status: SHIPPED | OUTPATIENT
Start: 2018-11-12 | End: 2018-12-19

## 2018-11-13 ENCOUNTER — TELEPHONE (OUTPATIENT)
Dept: FAMILY MEDICINE CLINIC | Facility: CLINIC | Age: 58
End: 2018-11-13

## 2018-11-13 DIAGNOSIS — Z12.31 ENCOUNTER FOR SCREENING MAMMOGRAM FOR BREAST CANCER: Primary | ICD-10-CM

## 2018-11-13 RX ORDER — DIAZEPAM 2 MG/1
TABLET ORAL
Qty: 3 TABLET | Refills: 0 | Status: SHIPPED
Start: 2018-11-13 | End: 2018-11-20

## 2018-11-16 RX ORDER — TRAMADOL HYDROCHLORIDE 50 MG/1
TABLET ORAL
Qty: 60 TABLET | Refills: 0 | Status: SHIPPED | OUTPATIENT
Start: 2018-11-16 | End: 2018-12-25

## 2018-11-16 RX ORDER — PROPRANOLOL HYDROCHLORIDE 60 MG/1
TABLET ORAL
Qty: 180 TABLET | Refills: 0 | Status: SHIPPED | OUTPATIENT
Start: 2018-11-16 | End: 2019-02-17

## 2018-11-16 NOTE — TELEPHONE ENCOUNTER
Last refill on Tramadol #60 with 0 refills on 8 30 2018   Last refill on Propranolol #180 with 3 refills on 10 19 2017   Last OV on 10 19 2018

## 2018-11-19 ENCOUNTER — TELEPHONE (OUTPATIENT)
Dept: FAMILY MEDICINE CLINIC | Facility: CLINIC | Age: 58
End: 2018-11-19

## 2018-11-19 DIAGNOSIS — M25.469 KNEE SWELLING: ICD-10-CM

## 2018-11-19 DIAGNOSIS — M25.562 ACUTE PAIN OF LEFT KNEE: Primary | ICD-10-CM

## 2018-11-19 NOTE — TELEPHONE ENCOUNTER
Received order from [facility] for patient, they are able to do test without contrast, states usually they will do with and without when looking for tumor or bone mass.    Per verbal with [address] okay to change order to MRI without contrast.   New order placed

## 2018-12-05 ENCOUNTER — TELEPHONE (OUTPATIENT)
Dept: NEUROLOGY | Facility: CLINIC | Age: 58
End: 2018-12-05

## 2018-12-19 DIAGNOSIS — M79.7 FIBROMYALGIA: ICD-10-CM

## 2018-12-19 DIAGNOSIS — G25.81 RESTLESS LEGS SYNDROME (RLS): ICD-10-CM

## 2018-12-19 RX ORDER — AMITRIPTYLINE HYDROCHLORIDE 100 MG/1
TABLET, FILM COATED ORAL
Qty: 45 TABLET | Refills: 0 | Status: SHIPPED | OUTPATIENT
Start: 2018-12-19 | End: 2018-12-28 | Stop reason: ALTCHOICE

## 2018-12-19 RX ORDER — MELOXICAM 15 MG/1
TABLET ORAL
Qty: 30 TABLET | Refills: 3 | Status: SHIPPED | OUTPATIENT
Start: 2018-12-19 | End: 2019-04-19

## 2018-12-19 RX ORDER — ROPINIROLE 0.5 MG/1
TABLET, FILM COATED ORAL
Qty: 30 TABLET | Refills: 0 | Status: SHIPPED | OUTPATIENT
Start: 2018-12-19 | End: 2019-01-08

## 2018-12-19 RX ORDER — GABAPENTIN 400 MG/1
CAPSULE ORAL
Qty: 120 CAPSULE | Refills: 0 | Status: SHIPPED | OUTPATIENT
Start: 2018-12-19 | End: 2019-01-23

## 2018-12-19 NOTE — TELEPHONE ENCOUNTER
meloxicam Last refilled on 8/13/18 for # 30 with 3 refills  Amitriptyline refill listed as historical  Last OV 10/19/18  Future Appointments   Date Time Provider Mago Gonzalez   12/19/2018  4:20 PM ROSEMARIE VILLALOBOS RM1 Saundra Calderon   12/26/2018  1:00 PM Sammi Bartholomew,

## 2018-12-26 RX ORDER — CYCLOBENZAPRINE HCL 10 MG
TABLET ORAL
Qty: 60 TABLET | Refills: 0 | Status: SHIPPED | OUTPATIENT
Start: 2018-12-26 | End: 2019-01-24

## 2018-12-26 RX ORDER — TRAMADOL HYDROCHLORIDE 50 MG/1
TABLET ORAL
Qty: 60 TABLET | Refills: 0 | Status: SHIPPED
Start: 2018-12-26 | End: 2019-04-04

## 2018-12-26 NOTE — TELEPHONE ENCOUNTER
Left message for the pt to call back to let me know how often she is taking the tramadol and the cyclobenzaprine      Faxed the tramadol script

## 2018-12-26 NOTE — TELEPHONE ENCOUNTER
Tramadol Last refilled on 11/16/18 for # 60 with 0 refills\  Cyclobenzaprine listed as historical  Last OV 10/19/18  Future Appointments   Date Time Provider Mago Gonzalez   1/3/2019  4:00 PM ROSEMARIE VILLALOBOS RM1 Breanne Calderon   1/3/2019  6:45 PM [facility] MR RM2 [facility]

## 2018-12-26 NOTE — TELEPHONE ENCOUNTER
Script printed and faxed, thanks. However, please find out how often she's usig these meds?  Once in awhile use fine, but using it more regularly (not well established waht that means but I use 4x/week or more as cut off) really increases risk of drug inte

## 2018-12-28 NOTE — TELEPHONE ENCOUNTER
Message   Received:  Today   Message Contents   Phil Huddleston Nurse   Caller: Pt (Today,  9:33 AM)             Pt returned our call from yesterday.  Please call pt at [phone]        Spoke with the pt and  Asked her about the tramad

## 2018-12-28 NOTE — TELEPHONE ENCOUNTER
Okay, glad she's off amitriptyline since using tramadol/cyclo, is less risky drug interaction. So she doesn't take the tramadol/cyclo every day, just as needed? Then that's minimially risky.   It's not well quantified how often is too often, but ideally I

## 2018-12-28 NOTE — TELEPHONE ENCOUNTER
Pt is coming in for an appt 1/9/18  Future Appointments   Date Time Provider Mago Gonzalez   1/3/2019  4:00 PM ROSEMARIE WILFREDO RM1 [facility] Irma Simpson   1/3/2019  6:45 PM [facility] MR RM2 [facility] MRI Arcenio Brooksey   1/8/2019  3:40 PM Angy Avila MD [facility]

## 2019-01-03 ENCOUNTER — HOSPITAL ENCOUNTER (OUTPATIENT)
Dept: MRI IMAGING | Facility: HOSPITAL | Age: 59
Discharge: HOME | End: 2019-01-03
Attending: FAMILY MEDICINE
Payer: MEDICARE

## 2019-01-03 ENCOUNTER — APPOINTMENT (OUTPATIENT)
Dept: MAMMOGRAPHY | Age: 59
End: 2019-01-03
Attending: FAMILY MEDICINE
Payer: MEDICARE

## 2019-01-03 DIAGNOSIS — M25.469 KNEE SWELLING: ICD-10-CM

## 2019-01-03 DIAGNOSIS — M25.562 ACUTE PAIN OF LEFT KNEE: ICD-10-CM

## 2019-01-03 PROCEDURE — 73721 MRI JNT OF LWR EXTRE W/O DYE: CPT | Performed by: FAMILY MEDICINE

## 2019-01-04 ENCOUNTER — TELEPHONE (OUTPATIENT)
Dept: FAMILY MEDICINE CLINIC | Facility: CLINIC | Age: 59
End: 2019-01-04

## 2019-01-04 NOTE — TELEPHONE ENCOUNTER
Injured her right knee, a couple of weeks ago. Last night, leaving [facility] after MRI of left knee, right knee crackled and gave out on her. Reports area is swollen. Painful if she tries to bear weight on it.  Very reluctant to drive, afraid her knee would no

## 2019-01-04 NOTE — TELEPHONE ENCOUNTER
Please notify patient I see MRI results--knee looks pretty rough--significant arthritis and cartilage wear and tear + meniscal injury and bakers cyst.  Is her f/u with ortho or with me If me, change to seeing ortho instead, no need to see me for this.   ANDREA

## 2019-01-04 NOTE — TELEPHONE ENCOUNTER
Efren, kassie.   She can either RICE and wait to see ortho, or go to  for eval where they could xray it if need be

## 2019-01-04 NOTE — TELEPHONE ENCOUNTER
Patient called and left a voicemail stating that she had to cancel her mammogram for today at 1 pm due to she hurt her knee and cannot drive. Patient wants to speak with a nurse regarding this injury. What she should do?

## 2019-01-08 ENCOUNTER — OFFICE VISIT (OUTPATIENT)
Dept: NEUROLOGY | Facility: CLINIC | Age: 59
End: 2019-01-08
Payer: MEDICARE

## 2019-01-08 VITALS
HEART RATE: 82 BPM | WEIGHT: 293 LBS | RESPIRATION RATE: 20 BRPM | SYSTOLIC BLOOD PRESSURE: 128 MMHG | DIASTOLIC BLOOD PRESSURE: 74 MMHG | BODY MASS INDEX: 44 KG/M2

## 2019-01-08 DIAGNOSIS — G60.8 PERIPHERAL SENSORY NEUROPATHY: ICD-10-CM

## 2019-01-08 DIAGNOSIS — G43.009 MIGRAINE WITHOUT AURA AND WITHOUT STATUS MIGRAINOSUS, NOT INTRACTABLE: ICD-10-CM

## 2019-01-08 DIAGNOSIS — M79.7 FIBROMYALGIA: ICD-10-CM

## 2019-01-08 DIAGNOSIS — G25.81 RESTLESS LEGS SYNDROME (RLS): Primary | ICD-10-CM

## 2019-01-08 PROCEDURE — 99213 OFFICE O/P EST LOW 20 MIN: CPT | Performed by: OTHER

## 2019-01-08 RX ORDER — AMITRIPTYLINE HYDROCHLORIDE 50 MG/1
50 TABLET, FILM COATED ORAL NIGHTLY
COMMUNITY
End: 2019-01-23 | Stop reason: ALTCHOICE

## 2019-01-08 NOTE — PATIENT INSTRUCTIONS
Refill policies:    • Allow 2-3 business days for refills; controlled substances may take longer.   • Contact your pharmacy at least 5 days prior to running out of medication and have them send an electronic request or submit request through the “request re entire amount billed. Precertification and Prior Authorizations: If your physician has recommended that you have a procedure or additional testing performed.   [facility]) will contact your insurance carrier to obtain pre-certi

## 2019-01-08 NOTE — PROGRESS NOTES
The patient is here for neuropathy. The patient has weakness in both knees. The patient has notice an increase in neuropathy since her last office visit.

## 2019-01-09 ENCOUNTER — OFFICE VISIT (OUTPATIENT)
Dept: FAMILY MEDICINE CLINIC | Facility: CLINIC | Age: 59
End: 2019-01-09

## 2019-01-09 ENCOUNTER — HOSPITAL ENCOUNTER (OUTPATIENT)
Dept: MAMMOGRAPHY | Age: 59
Discharge: HOME | End: 2019-01-09
Attending: FAMILY MEDICINE
Payer: MEDICARE

## 2019-01-09 DIAGNOSIS — Z12.31 VISIT FOR SCREENING MAMMOGRAM: ICD-10-CM

## 2019-01-09 DIAGNOSIS — F43.23 ADJUSTMENT DISORDER WITH MIXED ANXIETY AND DEPRESSED MOOD: ICD-10-CM

## 2019-01-09 DIAGNOSIS — Z13.220 ENCOUNTER FOR LIPID SCREENING FOR CARDIOVASCULAR DISEASE: ICD-10-CM

## 2019-01-09 DIAGNOSIS — E53.8 VITAMIN B12 DEFICIENCY: ICD-10-CM

## 2019-01-09 DIAGNOSIS — M47.812 OSTEOARTHRITIS OF CERVICAL SPINE, UNSPECIFIED SPINAL OSTEOARTHRITIS COMPLICATION STATUS: ICD-10-CM

## 2019-01-09 DIAGNOSIS — Z13.31 DEPRESSION SCREENING: ICD-10-CM

## 2019-01-09 DIAGNOSIS — M51.36 DDD (DEGENERATIVE DISC DISEASE), LUMBAR: ICD-10-CM

## 2019-01-09 DIAGNOSIS — G56.03 BILATERAL CARPAL TUNNEL SYNDROME: ICD-10-CM

## 2019-01-09 DIAGNOSIS — Z12.31 ENCOUNTER FOR SCREENING MAMMOGRAM FOR BREAST CANCER: ICD-10-CM

## 2019-01-09 DIAGNOSIS — M79.7 FIBROMYALGIA: ICD-10-CM

## 2019-01-09 DIAGNOSIS — Z11.59 NEED FOR HEPATITIS C SCREENING TEST: ICD-10-CM

## 2019-01-09 DIAGNOSIS — Z13.6 ENCOUNTER FOR LIPID SCREENING FOR CARDIOVASCULAR DISEASE: ICD-10-CM

## 2019-01-09 DIAGNOSIS — G89.29 CHRONIC PAIN OF LEFT KNEE: ICD-10-CM

## 2019-01-09 DIAGNOSIS — I34.1 MVP (MITRAL VALVE PROLAPSE): ICD-10-CM

## 2019-01-09 DIAGNOSIS — M25.562 CHRONIC PAIN OF LEFT KNEE: ICD-10-CM

## 2019-01-09 DIAGNOSIS — R73.9 ELEVATED BLOOD SUGAR: ICD-10-CM

## 2019-01-09 DIAGNOSIS — Z23 ENCOUNTER FOR IMMUNIZATION: ICD-10-CM

## 2019-01-09 DIAGNOSIS — Z00.00 ENCOUNTER FOR ANNUAL HEALTH EXAMINATION: Primary | ICD-10-CM

## 2019-01-09 DIAGNOSIS — E55.9 VITAMIN D DEFICIENCY: ICD-10-CM

## 2019-01-09 PROCEDURE — 77067 SCR MAMMO BI INCL CAD: CPT | Performed by: FAMILY MEDICINE

## 2019-01-09 NOTE — PROGRESS NOTES
HPI:   Rosanne Herrera is a 66year old female who presents for a Medicare Subsequent Annual Wellness visit (Pt already had Initial Annual Wellness). Dr. Lonnie Lock is her neurologist for neuropathy.   Dr. Tyron Panchal is her rheumatologist (also s spent on all Advance Directive Plannin::\"Advance care planning including the explanation and discussion of advance directives standard forms performed Face to Face with patient and Family/surrogate (if present), and forms available to patient in AVS 06/27/2017    CA 9.2 08/28/2017    ALB 3.9 12/16/2015    TSH 3.330 08/28/2017    CREATSERUM 1.04 (H) 08/28/2017    GLU 85 08/28/2017        CBC  (most recent labs)   Lab Results   Component Value Date    WBC 6.4 06/27/2017    HGB 12.7 06/27/2017     Prefilled syringe (28759) 01/09/2019        ASSESSMENT AND OTHER RELEVANT CHRONIC CONDITIONS:   Bernard Soliz is a 66year old female who presents for a Medicare Assessment.      PLAN SUMMARY:   Diagnoses and all orders for this visit:    Encouncynthia applicable    Flex Sigmoidoscopy Screen every 10 years No results found for this or any previous visit. No flowsheet data found. Fecal Occult Blood Annually No results found for: FOB No flowsheet data found.     Glaucoma Screening      [facility]  prescription benefits      SPECIFIC DISEASE MONITORING Internal Lab or Procedure External Lab or Procedure      Annual Monitoring of Persistent     Medications (ACE/ARB, digoxin diuretics, anticonvulsants.)    Potassium  Annually Potassium (mmol/

## 2019-01-09 NOTE — PROGRESS NOTES
HPI:    Patient ID: Blayne Gallego is a 66year old female. HPI    Patient presented for follow-up for peripheral sensory neuropathy, carpal tunnel syndrome and restless leg syndrome. Hx of unspecified autoimmune disease and prediabetes.   She Positive for numbness. Hematological: Negative. Psychiatric/Behavioral: Positive for sleep disturbance. All other systems reviewed and are negative.              Current Outpatient Medications:  Amitriptyline HCl 50 MG Oral Tab Take 50 mg by mouth ni Psychiatric: normal mood and affect. Neurological  Mental status: Awake, alert and oriented to time, place and person. Speech is fluent with intact comprehension, repetition and naming.  Recent and remote memory intact  Cranial nerves II-XII: grossly i

## 2019-01-23 ENCOUNTER — TELEPHONE (OUTPATIENT)
Dept: FAMILY MEDICINE CLINIC | Facility: CLINIC | Age: 59
End: 2019-01-23

## 2019-01-23 DIAGNOSIS — M79.7 FIBROMYALGIA: ICD-10-CM

## 2019-01-23 DIAGNOSIS — G25.81 RESTLESS LEGS SYNDROME (RLS): ICD-10-CM

## 2019-01-23 RX ORDER — ROPINIROLE 0.5 MG/1
TABLET, FILM COATED ORAL
Qty: 30 TABLET | Refills: 5 | Status: SHIPPED | OUTPATIENT
Start: 2019-01-23 | End: 2019-08-12

## 2019-01-23 RX ORDER — GABAPENTIN 400 MG/1
CAPSULE ORAL
Qty: 120 CAPSULE | Refills: 2 | Status: SHIPPED | OUTPATIENT
Start: 2019-01-23 | End: 2019-04-19

## 2019-01-23 RX ORDER — AMITRIPTYLINE HYDROCHLORIDE 100 MG/1
TABLET, FILM COATED ORAL
Qty: 135 TABLET | Refills: 3 | Status: SHIPPED | OUTPATIENT
Start: 2019-01-23 | End: 2020-02-19 | Stop reason: ALTCHOICE

## 2019-01-23 NOTE — TELEPHONE ENCOUNTER
LRF 12/19/18 #45 0rf  LOV  1/9/19  Future Appointments   Date Time Provider Mago Gonzalez   2/8/2019  3:30 PM Aaron Mcnulty MD [facility] EMG Nikole Cutting   7/9/2019  3:40 PM Whitney Villasenor MD [facility] [place]

## 2019-01-23 NOTE — TELEPHONE ENCOUNTER
Orders from Dr Romero Slider with [facility] rheumatology in blue book.      Future Appointments   Date Time Provider Mago Gonzalez   2/8/2019  3:30 PM Parris Fernando MD [facility] EMG Bobbi Stephens   7/9/2019  3:40 PM Jodi Bagley MD [facility] EM[facility]

## 2019-01-24 ENCOUNTER — TELEPHONE (OUTPATIENT)
Dept: FAMILY MEDICINE CLINIC | Facility: CLINIC | Age: 59
End: 2019-01-24

## 2019-01-24 NOTE — TELEPHONE ENCOUNTER
Last refill on Venlafaxine - No disp quantity or refill quantity entered- Start date 1 27 2016   Last OV with Dr. Nikita Patel on 10 19 2018  [address] on 1 9 2019 - Cancelled by patient

## 2019-01-24 NOTE — TELEPHONE ENCOUNTER
Pt advise that the pharmacy cannot accept a refill of the venlafaxin. Pharmacy has to have a new script. Chavo Corrigan

## 2019-01-25 RX ORDER — CYCLOBENZAPRINE HCL 10 MG
TABLET ORAL
Qty: 60 TABLET | Refills: 0 | Status: SHIPPED | OUTPATIENT
Start: 2019-01-25 | End: 2019-03-02

## 2019-01-25 NOTE — TELEPHONE ENCOUNTER
Spoke with the pt and advised that Dr. Alyce Nieto has not filled this medication before- she states that she thought that Dr. Alyce Nieto took this over    Advised that per [facility] she did not fill this previously  Pt v/u

## 2019-02-08 ENCOUNTER — TELEPHONE (OUTPATIENT)
Dept: FAMILY MEDICINE CLINIC | Facility: CLINIC | Age: 59
End: 2019-02-08

## 2019-02-08 NOTE — TELEPHONE ENCOUNTER
Called the pt and rescheduled her appt  Future Appointments   Date Time Provider Mago Lisa   2/26/2019  2:15 PM Bushra Welsh MD [facility] EMG Alfredito Triplett   7/9/2019  3:40 PM Mitzy Avila MD [facility] [place]
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
## 2019-02-18 RX ORDER — PROPRANOLOL HYDROCHLORIDE 60 MG/1
TABLET ORAL
Qty: 180 TABLET | Refills: 0 | Status: SHIPPED | OUTPATIENT
Start: 2019-02-18 | End: 2019-05-14

## 2019-02-18 NOTE — TELEPHONE ENCOUNTER
Last refilled on 11/16/18 for # 180 with 0 refills  Last OV 1/9/19, /74  Future Appointments   Date Time Provider Mago Gonzalez   2/26/2019  2:15 PM Efren Saucedo MD [facility] EMG Paresh Norris   7/9/2019  3:40 PM MD KAREN Falcon EMG Ar

## 2019-02-26 ENCOUNTER — OFFICE VISIT (OUTPATIENT)
Dept: FAMILY MEDICINE CLINIC | Facility: CLINIC | Age: 59
End: 2019-02-26
Payer: MEDICARE

## 2019-02-26 VITALS
DIASTOLIC BLOOD PRESSURE: 80 MMHG | HEART RATE: 82 BPM | WEIGHT: 293 LBS | BODY MASS INDEX: 43 KG/M2 | RESPIRATION RATE: 14 BRPM | TEMPERATURE: 97 F | SYSTOLIC BLOOD PRESSURE: 120 MMHG

## 2019-02-26 DIAGNOSIS — M51.36 DDD (DEGENERATIVE DISC DISEASE), LUMBAR: ICD-10-CM

## 2019-02-26 DIAGNOSIS — R74.8 ELEVATED VITAMIN B12 LEVEL: ICD-10-CM

## 2019-02-26 DIAGNOSIS — M25.562 CHRONIC PAIN OF LEFT KNEE: ICD-10-CM

## 2019-02-26 DIAGNOSIS — M47.812 OSTEOARTHRITIS OF CERVICAL SPINE, UNSPECIFIED SPINAL OSTEOARTHRITIS COMPLICATION STATUS: ICD-10-CM

## 2019-02-26 DIAGNOSIS — M79.7 FIBROMYALGIA: ICD-10-CM

## 2019-02-26 DIAGNOSIS — R79.9 ABNORMAL FINDING OF BLOOD CHEMISTRY: ICD-10-CM

## 2019-02-26 DIAGNOSIS — Z12.11 COLON CANCER SCREENING: ICD-10-CM

## 2019-02-26 DIAGNOSIS — Z11.59 NEED FOR HEPATITIS C SCREENING TEST: ICD-10-CM

## 2019-02-26 DIAGNOSIS — G62.9 NEUROPATHY: ICD-10-CM

## 2019-02-26 DIAGNOSIS — G56.03 BILATERAL CARPAL TUNNEL SYNDROME: ICD-10-CM

## 2019-02-26 DIAGNOSIS — I34.1 MVP (MITRAL VALVE PROLAPSE): ICD-10-CM

## 2019-02-26 DIAGNOSIS — K63.5 POLYP OF COLON, UNSPECIFIED PART OF COLON, UNSPECIFIED TYPE: ICD-10-CM

## 2019-02-26 DIAGNOSIS — K13.70 MOUTH LESION: ICD-10-CM

## 2019-02-26 DIAGNOSIS — M25.561 RIGHT KNEE PAIN, UNSPECIFIED CHRONICITY: ICD-10-CM

## 2019-02-26 DIAGNOSIS — R93.89 ABNORMAL FINDINGS ON DIAGNOSTIC IMAGING OF OTHER SPECIFIED BODY STRUCTURES: ICD-10-CM

## 2019-02-26 DIAGNOSIS — Z13.220 LIPID SCREENING: ICD-10-CM

## 2019-02-26 DIAGNOSIS — E55.9 VITAMIN D DEFICIENCY: ICD-10-CM

## 2019-02-26 DIAGNOSIS — R73.09 ELEVATED HEMOGLOBIN A1C: ICD-10-CM

## 2019-02-26 DIAGNOSIS — Z00.00 ENCOUNTER FOR ANNUAL HEALTH EXAMINATION: Primary | ICD-10-CM

## 2019-02-26 DIAGNOSIS — G89.29 CHRONIC PAIN OF LEFT KNEE: ICD-10-CM

## 2019-02-26 DIAGNOSIS — F43.23 ADJUSTMENT DISORDER WITH MIXED ANXIETY AND DEPRESSED MOOD: ICD-10-CM

## 2019-02-26 LAB
ALBUMIN SERPL-MCNC: 3.8 G/DL (ref 3.4–5)
ALBUMIN/GLOB SERPL: 0.9 {RATIO} (ref 1–2)
ALP LIVER SERPL-CCNC: 127 U/L (ref 46–118)
ALT SERPL-CCNC: 28 U/L (ref 13–56)
ANION GAP SERPL CALC-SCNC: 8 MMOL/L (ref 0–18)
AST SERPL-CCNC: 26 U/L (ref 15–37)
BASOPHILS # BLD AUTO: 0.06 X10(3) UL (ref 0–0.2)
BASOPHILS NFR BLD AUTO: 0.9 %
BILIRUB SERPL-MCNC: 0.8 MG/DL (ref 0.1–2)
BUN BLD-MCNC: 24 MG/DL (ref 7–18)
BUN/CREAT SERPL: 20.5 (ref 10–20)
CALCIUM BLD-MCNC: 9.1 MG/DL (ref 8.5–10.1)
CHLORIDE SERPL-SCNC: 100 MMOL/L (ref 98–107)
CHOLEST SMN-MCNC: 240 MG/DL (ref ?–200)
CO2 SERPL-SCNC: 26 MMOL/L (ref 21–32)
CREAT BLD-MCNC: 1.17 MG/DL (ref 0.55–1.02)
DEPRECATED HBV CORE AB SER IA-ACNC: 120.6 NG/ML (ref 18–340)
DEPRECATED RDW RBC AUTO: 41.1 FL (ref 35.1–46.3)
EOSINOPHIL # BLD AUTO: 0.16 X10(3) UL (ref 0–0.7)
EOSINOPHIL NFR BLD AUTO: 2.4 %
ERYTHROCYTE [DISTWIDTH] IN BLOOD BY AUTOMATED COUNT: 11.9 % (ref 11–15)
GLOBULIN PLAS-MCNC: 4.1 G/DL (ref 2.8–4.4)
GLUCOSE BLD-MCNC: 103 MG/DL (ref 70–99)
HCT VFR BLD AUTO: 39.9 % (ref 35–48)
HCV AB SERPL QL IA: NONREACTIVE
HDLC SERPL-MCNC: 52 MG/DL (ref 40–59)
HGB BLD-MCNC: 13.6 G/DL (ref 12–16)
IMM GRANULOCYTES # BLD AUTO: 0.03 X10(3) UL (ref 0–1)
IMM GRANULOCYTES NFR BLD: 0.5 %
LDLC SERPL DIRECT ASSAY-MCNC: 153 MG/DL (ref ?–100)
LYMPHOCYTES # BLD AUTO: 1.74 X10(3) UL (ref 1–4)
LYMPHOCYTES NFR BLD AUTO: 26.1 %
M PROTEIN MFR SERPL ELPH: 7.9 G/DL (ref 6.4–8.2)
MCH RBC QN AUTO: 32.2 PG (ref 26–34)
MCHC RBC AUTO-ENTMCNC: 34.1 G/DL (ref 31–37)
MCV RBC AUTO: 94.5 FL (ref 80–100)
MONOCYTES # BLD AUTO: 0.78 X10(3) UL (ref 0.1–1)
MONOCYTES NFR BLD AUTO: 11.7 %
NEUTROPHILS # BLD AUTO: 3.89 X10 (3) UL (ref 1.5–7.7)
NEUTROPHILS # BLD AUTO: 3.89 X10(3) UL (ref 1.5–7.7)
NEUTROPHILS NFR BLD AUTO: 58.4 %
NONHDLC SERPL-MCNC: 188 MG/DL (ref ?–130)
OSMOLALITY SERPL CALC.SUM OF ELEC: 282 MOSM/KG (ref 275–295)
PLATELET # BLD AUTO: 207 10(3)UL (ref 150–450)
POTASSIUM SERPL-SCNC: 4.4 MMOL/L (ref 3.5–5.1)
RBC # BLD AUTO: 4.22 X10(6)UL (ref 3.8–5.3)
SODIUM SERPL-SCNC: 134 MMOL/L (ref 136–145)
TRIGL SERPL-MCNC: 402 MG/DL (ref 30–149)
TSI SER-ACNC: 4.8 MIU/ML (ref 0.36–3.74)
VIT B12 SERPL-MCNC: 451 PG/ML (ref 193–986)
VIT D+METAB SERPL-MCNC: 10.2 NG/ML (ref 30–100)
WBC # BLD AUTO: 6.7 X10(3) UL (ref 4–11)

## 2019-02-26 PROCEDURE — 90686 IIV4 VACC NO PRSV 0.5 ML IM: CPT | Performed by: FAMILY MEDICINE

## 2019-02-26 PROCEDURE — G0439 PPPS, SUBSEQ VISIT: HCPCS | Performed by: FAMILY MEDICINE

## 2019-02-26 PROCEDURE — 85025 COMPLETE CBC W/AUTO DIFF WBC: CPT | Performed by: FAMILY MEDICINE

## 2019-02-26 PROCEDURE — 36415 COLL VENOUS BLD VENIPUNCTURE: CPT | Performed by: FAMILY MEDICINE

## 2019-02-26 PROCEDURE — 86803 HEPATITIS C AB TEST: CPT | Performed by: FAMILY MEDICINE

## 2019-02-26 PROCEDURE — 82306 VITAMIN D 25 HYDROXY: CPT | Performed by: FAMILY MEDICINE

## 2019-02-26 PROCEDURE — 80061 LIPID PANEL: CPT | Performed by: FAMILY MEDICINE

## 2019-02-26 PROCEDURE — 83036 HEMOGLOBIN GLYCOSYLATED A1C: CPT | Performed by: FAMILY MEDICINE

## 2019-02-26 PROCEDURE — 82728 ASSAY OF FERRITIN: CPT | Performed by: FAMILY MEDICINE

## 2019-02-26 PROCEDURE — G0008 ADMIN INFLUENZA VIRUS VAC: HCPCS | Performed by: FAMILY MEDICINE

## 2019-02-26 PROCEDURE — 84443 ASSAY THYROID STIM HORMONE: CPT | Performed by: FAMILY MEDICINE

## 2019-02-26 PROCEDURE — 80053 COMPREHEN METABOLIC PANEL: CPT | Performed by: FAMILY MEDICINE

## 2019-02-26 PROCEDURE — 83721 ASSAY OF BLOOD LIPOPROTEIN: CPT | Performed by: FAMILY MEDICINE

## 2019-02-26 PROCEDURE — 82607 VITAMIN B-12: CPT | Performed by: FAMILY MEDICINE

## 2019-02-26 RX ORDER — VENLAFAXINE HYDROCHLORIDE 150 MG/1
150 CAPSULE, EXTENDED RELEASE ORAL DAILY
Qty: 30 CAPSULE | Refills: 3 | Status: SHIPPED | OUTPATIENT
Start: 2019-02-26 | End: 2019-04-23

## 2019-02-26 NOTE — PROGRESS NOTES
HPI:   Payal Polanco is a 66year old female who presents for a Medicare Subsequent Annual Wellness visit (Pt already had Initial Annual Wellness).     Patient updates me she was diagnosed with early signs of glaucoma that they're watching (at b chart below     Functional Ability/Status   Usman Doshi has some abnormal functions as listed below:  She has no issues with dressing and bathing based on screening of functional status.                           She has Vision problems based on pain of left knee     Vitamin D deficiency    Wt Readings from Last 3 Encounters:  02/26/19 : 298 lb  01/08/19 : (!) 310 lb  10/19/18 : (!) 310 lb     Last Cholesterol Labs:   Lab Results   Component Value Date    CHOLEST 240 (H) 02/26/2019    HDL 52 02/26 a past medical history of Fibromyalgia, Obesity, unspecified, MEGAN (obstructive sleep apnea) (EDW PSG-1/02/17), Polyneuropathy, Polyp of colon (July 2016), and Unspecified essential hypertension.     She  has a past surgical history that includes other surgi Both Eyes Visual Acuity: Uncorrected Both Eyes Chart Acuity: 20/25   Able To Tolerate Visual Acuity: Yes      General Appearance:  Alert, cooperative, no distress, appears stated age   Head:  Normocephalic, without obvious abnormality, atraumatic   Eyes: foods) and exercise habits (150min low to moderate intensity exercise/week minimum to shoot for) work on that. If already doing that well, keep it up.     -     FLULAVAL INFLUENZA VACCINE QUAD PRESERVATIVE FREE 0.5 ML  -     HCV ANTIBODY;  Future  -     FRANCES ASSAY, THYROID STIM HORMONE; Future  -     HEMOGLOBIN A1C; Future  -     FERRITIN; Future  -     VITAMIN B12; Future  -     CBC W/ DIFFERENTIAL    Abnormal findings on diagnostic imaging of other specified body structures   -     ASSAY, THYROID STIM HORMO Annually LDL Cholesterol (mg/dL)   Date Value   02/26/2019      Comment:     Unable to calculate LDL due to Triglycerides >400.0 mg/dL      Desirable <100 mg/dL   Borderline 100-129 mg/dL   High     >=130mg/dL         LDL CHOLESTROL (mg/dL)   Date Value Factor VIII or IX concentrates   Clients of institutions for the mentally retarded   Persons who live in the same house as a HepB virus carrier   Homosexual men   Illicit injectable drug abusers     Tetanus Toxoid  Only covered with a cut with metal- TD an

## 2019-02-27 LAB
EST. AVERAGE GLUCOSE BLD GHB EST-MCNC: 131 MG/DL (ref 68–126)
HBA1C MFR BLD HPLC: 6.2 % (ref ?–5.7)

## 2019-03-04 ENCOUNTER — TELEPHONE (OUTPATIENT)
Dept: FAMILY MEDICINE CLINIC | Facility: CLINIC | Age: 59
End: 2019-03-04

## 2019-03-04 DIAGNOSIS — R94.6 ABNORMAL RESULTS OF THYROID FUNCTION STUDIES: ICD-10-CM

## 2019-03-04 DIAGNOSIS — E55.9 VITAMIN D DEFICIENCY: ICD-10-CM

## 2019-03-04 DIAGNOSIS — Z13.29 SCREENING FOR THYROID DISORDER: ICD-10-CM

## 2019-03-04 DIAGNOSIS — R79.89 ABNORMAL THYROID BLOOD TEST: Primary | ICD-10-CM

## 2019-03-04 DIAGNOSIS — E78.89 LIPIDS ABNORMAL: ICD-10-CM

## 2019-03-04 RX ORDER — CYCLOBENZAPRINE HCL 10 MG
TABLET ORAL
Qty: 60 TABLET | Refills: 0 | Status: SHIPPED | OUTPATIENT
Start: 2019-03-04 | End: 2019-04-23

## 2019-03-04 RX ORDER — ERGOCALCIFEROL 1.25 MG/1
50000 CAPSULE ORAL WEEKLY
Qty: 12 CAPSULE | Refills: 0 | Status: SHIPPED | OUTPATIENT
Start: 2019-03-04 | End: 2020-02-19 | Stop reason: ALTCHOICE

## 2019-03-04 NOTE — TELEPHONE ENCOUNTER
Can you please clarify, if she's taking tramdaol instead of cyclobenzaprine as the message says, does she need the cyclobenzaprine refill?     I was going to defer PHYSICAL THERAPY orders to ortho she plans on seeing in [facility]

## 2019-03-04 NOTE — TELEPHONE ENCOUNTER
----- Message from John Davis MD sent at 3/3/2019 11:42 PM CST -----  Blood sugar stable in prediabetic range. Cholesterol really not great.   I would advise really working on eating habits If there is room for improvement in eating habits (shooting for

## 2019-03-04 NOTE — TELEPHONE ENCOUNTER
Script sent, and agree given the tramadol, cyclobenzaprine and amitriptyline can interact I don't want her taking all 3 together, only 2 of those 3 at a time (she was told this a few months ago as well)
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
## 2019-03-04 NOTE — TELEPHONE ENCOUNTER
Last refilled on 1/25/19 for # 60- with 0 refills  Last OV 2/26/19  Future Appointments   Date Time Provider Mago Gonzalez   7/9/2019  3:40 PM Hilda Avila MD [facility] [place]        Thank you.

## 2019-03-12 ENCOUNTER — TELEPHONE (OUTPATIENT)
Dept: FAMILY MEDICINE CLINIC | Facility: CLINIC | Age: 59
End: 2019-03-12

## 2019-03-12 NOTE — TELEPHONE ENCOUNTER
PT RETURNED CALL. PT WAS ADV TO CALL BACK AND ADV DR HOW SHE WAS TAKING HER MEDS. PT TAKES CYCLOBENZAPRINE, (DOES NOT HAVE ANY AT THIS TIME)    WHEN PT RUNS OUT THEN SHE TAKES TRAMADOL. PT ADV SHE DOES NOT TAKE THE 2 MEDS TOGETHER.  THE ONLY TIME S

## 2019-03-25 RX ORDER — ERGOCALCIFEROL 1.25 MG/1
CAPSULE ORAL
Qty: 12 CAPSULE | Refills: 0 | OUTPATIENT
Start: 2019-03-25

## 2019-03-25 NOTE — TELEPHONE ENCOUNTER
Last refilled on 3/4/19 for # 12 with 0 refills  Last vit d 10.2 on 2/26/19  Last OV 2/26/19  Future Appointments   Date Time Provider Mago Gonzalez   7/9/2019  3:40 PM Kimmy Avila MD [facility] [place]        Thank you.

## 2019-03-28 ENCOUNTER — PATIENT OUTREACH (OUTPATIENT)
Dept: FAMILY MEDICINE CLINIC | Facility: CLINIC | Age: 59
End: 2019-03-28

## 2019-04-04 ENCOUNTER — TELEPHONE (OUTPATIENT)
Dept: FAMILY MEDICINE CLINIC | Facility: CLINIC | Age: 59
End: 2019-04-04

## 2019-04-04 RX ORDER — TRAMADOL HYDROCHLORIDE 50 MG/1
50 TABLET ORAL EVERY 6 HOURS PRN
Qty: 60 TABLET | Refills: 0 | OUTPATIENT
Start: 2019-04-04 | End: 2019-07-14

## 2019-04-04 NOTE — TELEPHONE ENCOUNTER
PT CALLED AND ADV NEEDS REFILL OF     TRAMADOL HCL 50 MG Oral Tab    PT WANTED TO LET DR Noel Collet KNOW THAT SHE ALTERNATES CYCLOBENZAPRINE AND TRAMADOL. PT HAS ECHO APT TOMORROW AND IS LEAVING STRAIGHT TO [place] RIGHT AFTER APT.     PT AWARE DR STRICKLAND NOT I

## 2019-04-19 DIAGNOSIS — M79.7 FIBROMYALGIA: ICD-10-CM

## 2019-04-19 RX ORDER — GABAPENTIN 400 MG/1
CAPSULE ORAL
Qty: 120 CAPSULE | Refills: 0 | Status: SHIPPED | OUTPATIENT
Start: 2019-04-19 | End: 2019-06-13

## 2019-04-19 RX ORDER — MELOXICAM 15 MG/1
TABLET ORAL
Qty: 30 TABLET | Refills: 3 | Status: SHIPPED | OUTPATIENT
Start: 2019-04-19 | End: 2019-08-12

## 2019-04-19 NOTE — TELEPHONE ENCOUNTER
Last refilled on 12/19/18 for # 30 with 3 refills  Last OV 2/26/19  Future Appointments   Date Time Provider Mago Gonzalez   4/30/2019  3:20 PM Mariana Joshi MD [facility]   5/1/2019  3:45 PM [facility] CARD TM/STRESS/ECHO RM 1 [facility] C[facility] Cayden Betancourt

## 2019-04-23 RX ORDER — VENLAFAXINE HYDROCHLORIDE 150 MG/1
CAPSULE, EXTENDED RELEASE ORAL
Qty: 90 CAPSULE | Refills: 1 | Status: SHIPPED | OUTPATIENT
Start: 2019-04-23 | End: 2019-11-11

## 2019-04-23 RX ORDER — CYCLOBENZAPRINE HCL 10 MG
10 TABLET ORAL 2 TIMES DAILY PRN
Qty: 60 TABLET | Refills: 0 | Status: SHIPPED | OUTPATIENT
Start: 2019-04-23 | End: 2019-05-10

## 2019-04-23 NOTE — TELEPHONE ENCOUNTER
LOV: 2/26/19  Last Refill:   Cyclobenzaprine  #60 0 RF  Venlafaxine   #30 3 RF    Future Appointments   Date Time Provider Mago Gonzalez   4/30/2019  3:20 PM Naveed García MD [facility] NP [facility] Tobar   5/1/2019  3:45 PM [facility] CARD TM/STRESS/ECHO RM 1 Y

## 2019-05-08 RX ORDER — TRAMADOL HYDROCHLORIDE 50 MG/1
50 TABLET ORAL EVERY 6 HOURS PRN
Qty: 60 TABLET | Refills: 0 | Status: CANCELLED
Start: 2019-05-08

## 2019-05-08 NOTE — TELEPHONE ENCOUNTER
[facility] IN [place] CALLED AND IS REQUESTING REFILL OF     traMADol HCl 50 MG Oral Tab    PLEASE SEND TO CAROLYN CABAN    THANK YOU
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
## 2019-05-08 NOTE — TELEPHONE ENCOUNTER
Pt called back and I asked her if she moved to [place] or is she there just visiting? She states that she is viisting because she has no place to live [place] in [place]- she is working on getting a place out here.     She needs a refill of the cyclobenzaprine and trama

## 2019-05-09 NOTE — TELEPHONE ENCOUNTER
Spoke with patient and advised I was calling in regard to her message from yesterday that she needs a refill of tramadol. Patient states she does not need the tramadol. States he got that filled last month.  She needs a refill of cyclobenzaprine as she was

## 2019-05-10 RX ORDER — CYCLOBENZAPRINE HCL 10 MG
10 TABLET ORAL 2 TIMES DAILY PRN
Qty: 60 TABLET | Refills: 0 | Status: SHIPPED | OUTPATIENT
Start: 2019-05-10 | End: 2019-06-13

## 2019-05-14 RX ORDER — PROPRANOLOL HYDROCHLORIDE 60 MG/1
TABLET ORAL
Qty: 180 TABLET | Refills: 10 | Status: SHIPPED | OUTPATIENT
Start: 2019-05-14 | End: 2020-06-07

## 2019-06-13 DIAGNOSIS — M79.7 FIBROMYALGIA: ICD-10-CM

## 2019-06-13 RX ORDER — GABAPENTIN 400 MG/1
CAPSULE ORAL
Qty: 120 CAPSULE | Refills: 0 | Status: SHIPPED | OUTPATIENT
Start: 2019-06-13 | End: 2019-07-31

## 2019-06-13 RX ORDER — CYCLOBENZAPRINE HCL 10 MG
TABLET ORAL
Qty: 60 TABLET | Refills: 0 | Status: SHIPPED | OUTPATIENT
Start: 2019-06-13 | End: 2019-08-12

## 2019-06-13 NOTE — TELEPHONE ENCOUNTER
Last refilled on 5/10/19 for # 60 with 0 refills  Last OV 2/26/19  Future Appointments   Date Time Provider Mago Gonzalez   6/19/2019  3:45 PM [facility] C[facility] TM/STRESS/ECHO RM 1 [facility] CARD Etta Kincaid   7/9/2019  3:40 PM MD KAREN Lau

## 2019-07-15 ENCOUNTER — TELEPHONE (OUTPATIENT)
Dept: FAMILY MEDICINE CLINIC | Facility: CLINIC | Age: 59
End: 2019-07-15

## 2019-07-15 RX ORDER — TRAMADOL HYDROCHLORIDE 50 MG/1
TABLET ORAL
Qty: 60 TABLET | Refills: 0 | Status: SHIPPED
Start: 2019-07-15 | End: 2019-09-07

## 2019-07-15 NOTE — TELEPHONE ENCOUNTER
Last refilled on 4/4/19 for # 60 with 0 refills  Last OV 2/26/19  Future Appointments   Date Time Provider Mago Gonzalez   7/19/2019 11:00 AM SAMANTHA BOURNE NURSE WENCESLAO Pettit   7/20/2019  3:15 PM [facility] CARD STRESS ECHO RM 1 ECARD ECHO [facility] AT [facility]

## 2019-07-15 NOTE — TELEPHONE ENCOUNTER
Called to confirm pharmacy. Pt didn't need Tramadol yet but will pick it along with her other prescriptions in [place]. Script faxed.

## 2019-07-15 NOTE — TELEPHONE ENCOUNTER
Pharmacy called for a diagnosis for the tramadol script    Advised they can use M25.562 Chronic pain of left knee

## 2019-07-31 DIAGNOSIS — M79.7 FIBROMYALGIA: ICD-10-CM

## 2019-07-31 RX ORDER — GABAPENTIN 400 MG/1
CAPSULE ORAL
Qty: 180 CAPSULE | Refills: 0 | Status: SHIPPED | OUTPATIENT
Start: 2019-07-31 | End: 2019-08-21

## 2019-07-31 NOTE — TELEPHONE ENCOUNTER
Patient has f/u scheduled for 9/9/2019. Patient requesting this Rx go to [facility] in [place], [place] due to vacation. Pended 45 day supply    Medication: Gabapentin    Date of last refill: 6/13/2019 (#120/0)  [place]      Last

## 2019-08-12 DIAGNOSIS — G25.81 RESTLESS LEGS SYNDROME (RLS): ICD-10-CM

## 2019-08-12 RX ORDER — MELOXICAM 15 MG/1
TABLET ORAL
Qty: 30 TABLET | Refills: 3 | Status: SHIPPED | OUTPATIENT
Start: 2019-08-12 | End: 2019-11-29

## 2019-08-12 RX ORDER — CYCLOBENZAPRINE HCL 10 MG
TABLET ORAL
Qty: 60 TABLET | Refills: 0 | Status: SHIPPED | OUTPATIENT
Start: 2019-08-12 | End: 2019-09-09

## 2019-08-12 NOTE — TELEPHONE ENCOUNTER
Last refilled on 6/13/19 for # 60 with 0 refills  Last OV 2/26/19  Future Appointments   Date Time Provider Mago Gonzalez   9/9/2019  1:20 PM MD KAREN Kaur   9/9/2019  3:15 PM [facility] CARD ECHO RM 3 ECARD ECHO [facility]

## 2019-08-12 NOTE — TELEPHONE ENCOUNTER
Last refilled on 4/19/19 for # 30 with 3 refills  Last OV 2/26/19  Future Appointments   Date Time Provider Mago Gonzalez   9/9/2019  1:20 PM MD KAREN Greco   9/9/2019  3:15 PM [facility] CARD ECHO RM 3 ECARD ECHO [facility]

## 2019-08-13 ENCOUNTER — TELEPHONE (OUTPATIENT)
Dept: FAMILY MEDICINE CLINIC | Facility: CLINIC | Age: 59
End: 2019-08-13

## 2019-08-13 DIAGNOSIS — G25.81 RESTLESS LEGS SYNDROME (RLS): ICD-10-CM

## 2019-08-13 RX ORDER — ROPINIROLE 1 MG/1
1 TABLET, FILM COATED ORAL NIGHTLY
Qty: 30 TABLET | Refills: 0 | Status: SHIPPED | OUTPATIENT
Start: 2019-08-13 | End: 2020-02-19

## 2019-08-13 RX ORDER — ROPINIROLE 1 MG/1
TABLET, FILM COATED ORAL
Qty: 90 TABLET | Refills: 0 | OUTPATIENT
Start: 2019-08-13

## 2019-08-13 RX ORDER — ROPINIROLE 0.5 MG/1
TABLET, FILM COATED ORAL
Qty: 30 TABLET | Refills: 0 | Status: SHIPPED | OUTPATIENT
Start: 2019-08-13 | End: 2019-10-11

## 2019-08-21 ENCOUNTER — TELEPHONE (OUTPATIENT)
Dept: NEUROLOGY | Facility: CLINIC | Age: 59
End: 2019-08-21

## 2019-08-21 DIAGNOSIS — G25.81 RESTLESS LEGS SYNDROME (RLS): ICD-10-CM

## 2019-08-21 DIAGNOSIS — M79.7 FIBROMYALGIA: ICD-10-CM

## 2019-08-21 RX ORDER — GABAPENTIN 400 MG/1
CAPSULE ORAL
Qty: 180 CAPSULE | Refills: 0 | Status: SHIPPED
Start: 2019-08-21 | End: 2020-02-19

## 2019-08-21 NOTE — TELEPHONE ENCOUNTER
Spoke with patient regarding refill request received from [facility]. Patient states is using pharmacy in [facility] at this time . Requests refill for Gabapentin. Informed that she can receive a 30 day supply at this time.  Patient also states s

## 2019-08-21 NOTE — TELEPHONE ENCOUNTER
Received fax from [facility] in [facility] requesting refill on Ropinerole. Fax sent back with note that patient filled Rx at a different [facility].

## 2019-08-28 ENCOUNTER — NURSE ONLY (OUTPATIENT)
Dept: FAMILY MEDICINE CLINIC | Facility: CLINIC | Age: 59
End: 2019-08-28
Payer: MEDICARE

## 2019-08-28 DIAGNOSIS — E78.89 LIPIDS ABNORMAL: ICD-10-CM

## 2019-08-28 DIAGNOSIS — R94.6 ABNORMAL RESULTS OF THYROID FUNCTION STUDIES: ICD-10-CM

## 2019-08-28 DIAGNOSIS — R79.89 ABNORMAL THYROID BLOOD TEST: ICD-10-CM

## 2019-08-28 DIAGNOSIS — E55.9 VITAMIN D DEFICIENCY: ICD-10-CM

## 2019-08-28 LAB
CHOLEST SMN-MCNC: 156 MG/DL (ref ?–200)
HDLC SERPL-MCNC: 31 MG/DL (ref 40–59)
LDLC SERPL CALC-MCNC: 67 MG/DL (ref ?–100)
NONHDLC SERPL-MCNC: 125 MG/DL (ref ?–130)
T4 FREE SERPL-MCNC: 1 NG/DL (ref 0.8–1.7)
TRIGL SERPL-MCNC: 292 MG/DL (ref 30–149)
TSI SER-ACNC: 8.05 MIU/ML (ref 0.36–3.74)
VIT D+METAB SERPL-MCNC: 23.9 NG/ML (ref 30–100)
VLDLC SERPL CALC-MCNC: 58 MG/DL (ref 0–30)

## 2019-08-28 PROCEDURE — 80061 LIPID PANEL: CPT | Performed by: FAMILY MEDICINE

## 2019-08-28 PROCEDURE — 84443 ASSAY THYROID STIM HORMONE: CPT | Performed by: FAMILY MEDICINE

## 2019-08-28 PROCEDURE — 84439 ASSAY OF FREE THYROXINE: CPT | Performed by: FAMILY MEDICINE

## 2019-08-28 PROCEDURE — 82306 VITAMIN D 25 HYDROXY: CPT | Performed by: FAMILY MEDICINE

## 2019-08-31 ENCOUNTER — TELEPHONE (OUTPATIENT)
Dept: FAMILY MEDICINE CLINIC | Facility: CLINIC | Age: 59
End: 2019-08-31

## 2019-08-31 DIAGNOSIS — E03.9 HYPOTHYROIDISM, UNSPECIFIED TYPE: Primary | ICD-10-CM

## 2019-08-31 RX ORDER — LEVOTHYROXINE SODIUM 0.07 MG/1
75 TABLET ORAL
Qty: 30 TABLET | Refills: 1 | Status: SHIPPED | OUTPATIENT
Start: 2019-08-31 | End: 2019-10-30

## 2019-08-31 RX ORDER — LEVOTHYROXINE SODIUM 0.07 MG/1
TABLET ORAL
Qty: 90 TABLET | Refills: 1 | OUTPATIENT
Start: 2019-08-31

## 2019-08-31 NOTE — TELEPHONE ENCOUNTER
----- Message from Jaylen Don MD sent at 8/31/2019 10:55 AM CDT -----  Neomia Tushar look better, still elevated, keep up efforts at healthy lifestyle to lower risk associated with this.   She may be developing a thyroid problem badsed on her TSH--but was she o

## 2019-08-31 NOTE — TELEPHONE ENCOUNTER
LOV: 2/26/19   Last Refill: today #30 1 RF    Future Appointments   Date Time Provider Mago Gonzalez   9/9/2019  1:20 PM MD KAREN Mckenna   9/9/2019  3:15 PM [facility] CARD ECHO RM 3 ECARD ECHO [facility] AT [facility]

## 2019-08-31 NOTE — TELEPHONE ENCOUNTER
Patient has been notified, verbalized understanding of information. Pt states she was not on a biotin containing supplement.       She also wanted to let Dr. Flex Del Valle know she broke her wrist in [place] and wanted Dr. Ken Brunson advice on whether to have surge

## 2019-08-31 NOTE — TELEPHONE ENCOUNTER
If she needs surgery for it I''d probably do it in [place] where she has a place to stay right now. Does she have a family history of thyroid disorder or has she ever been told she has one?   She most likely is developing what we call hypothyroidism, tho

## 2019-08-31 NOTE — TELEPHONE ENCOUNTER
Patient has been notified, verbalized understanding of information. Denies further questions. Pt states she does have a personal hx of thyroid disease. She was told a long time ago she had a thyroid disorder.        Medication has been sent to pharmacy

## 2019-09-09 ENCOUNTER — TELEPHONE (OUTPATIENT)
Dept: FAMILY MEDICINE CLINIC | Facility: CLINIC | Age: 59
End: 2019-09-09

## 2019-09-09 RX ORDER — VENLAFAXINE HYDROCHLORIDE 150 MG/1
CAPSULE, EXTENDED RELEASE ORAL
Qty: 30 CAPSULE | Refills: 0 | OUTPATIENT
Start: 2019-09-09

## 2019-09-09 RX ORDER — MELOXICAM 15 MG/1
TABLET ORAL
Qty: 30 TABLET | Refills: 0 | OUTPATIENT
Start: 2019-09-09

## 2019-09-09 RX ORDER — PROPRANOLOL HYDROCHLORIDE 60 MG/1
TABLET ORAL
Qty: 180 TABLET | Refills: 0 | OUTPATIENT
Start: 2019-09-09

## 2019-09-09 RX ORDER — CYCLOBENZAPRINE HCL 10 MG
TABLET ORAL
Qty: 60 TABLET | Refills: 0 | Status: SHIPPED | OUTPATIENT
Start: 2019-09-09 | End: 2019-10-30

## 2019-09-09 NOTE — TELEPHONE ENCOUNTER
Spoke with Rober at [facility]. States they found refills of Meloxicam, Propanolol and Venlafaxine in their system. Only Tramadol needed.

## 2019-09-10 RX ORDER — TRAMADOL HYDROCHLORIDE 50 MG/1
TABLET ORAL
Qty: 60 TABLET | Refills: 0 | Status: SHIPPED | OUTPATIENT
Start: 2019-09-10 | End: 2019-11-29

## 2019-09-30 ENCOUNTER — HOSPITAL ENCOUNTER (OUTPATIENT)
Age: 59
Discharge: HOME | End: 2019-09-30
Attending: FAMILY MEDICINE
Payer: MEDICARE

## 2019-09-30 VITALS
SYSTOLIC BLOOD PRESSURE: 155 MMHG | DIASTOLIC BLOOD PRESSURE: 78 MMHG | OXYGEN SATURATION: 98 % | RESPIRATION RATE: 16 BRPM | TEMPERATURE: 97 F | HEART RATE: 96 BPM

## 2019-09-30 DIAGNOSIS — Z48.89 ENCOUNTER FOR POST SURGICAL WOUND CHECK: Primary | ICD-10-CM

## 2019-09-30 DIAGNOSIS — L03.113 CELLULITIS OF RIGHT UPPER EXTREMITY: ICD-10-CM

## 2019-09-30 PROCEDURE — 99204 OFFICE O/P NEW MOD 45 MIN: CPT

## 2019-09-30 PROCEDURE — 99213 OFFICE O/P EST LOW 20 MIN: CPT

## 2019-09-30 RX ORDER — CLINDAMYCIN HYDROCHLORIDE 300 MG/1
300 CAPSULE ORAL 3 TIMES DAILY
Qty: 30 CAPSULE | Refills: 0 | Status: SHIPPED | OUTPATIENT
Start: 2019-09-30 | End: 2019-10-10

## 2019-10-01 ENCOUNTER — TELEPHONE (OUTPATIENT)
Dept: FAMILY MEDICINE CLINIC | Facility: CLINIC | Age: 59
End: 2019-10-01

## 2019-10-01 DIAGNOSIS — M25.531 RIGHT WRIST PAIN: Primary | ICD-10-CM

## 2019-10-01 NOTE — TELEPHONE ENCOUNTER
PT CALLED AND ADV THAT SHE THINKS SHE MAY NEED NEW REFERRAL FOR DR Kristofer Medellin FOR WRIST ISSUE. PT HAS REFERRAL AUTHORIZED FROM February WITH 3 VISITS. PT ADV SHE DID NOT SEE SPECIALIST YET.  DOES SHE NEED NEW REFERRAL?     PLEASE ADV    THANK YOU

## 2019-10-01 NOTE — TELEPHONE ENCOUNTER
Referral was authorized from 02/26/19 through 02/26/2020  For Dr. Cheryl Laura. Pt has not used any visit- this referral is just for a knee- pt needs separate referral for wrist.         Pt had surgery on wrist in [place].  Was seen in [address] [place] [address] yesterday for infecti

## 2019-10-02 NOTE — ED PROVIDER NOTES
Patient Seen in: [address] [facility]      History   Patient presents with:  Wound Recheck    Stated Complaint: right wrist arm injury    HPI    64year old female presents for dressing change of the surgical wound.  States she was in [place] above.    Physical Exam     ED Triage Vitals [09/30/19 1140]   /78   Pulse 96   Resp 16   Temp 97 °F (36.1 °C)   Temp src Temporal   SpO2 98 %   O2 Device None (Room air)       Current:/78   Pulse 96   Temp 97 °F (36.1 °C) (Temporal)   Resp 16

## 2019-10-03 ENCOUNTER — TELEPHONE (OUTPATIENT)
Dept: FAMILY MEDICINE CLINIC | Facility: CLINIC | Age: 59
End: 2019-10-03

## 2019-10-03 RX ORDER — HYDROCODONE BITARTRATE AND ACETAMINOPHEN 7.5; 325 MG/1; MG/1
1 TABLET ORAL EVERY 6 HOURS PRN
Qty: 30 TABLET | Refills: 0 | Status: SHIPPED | OUTPATIENT
Start: 2019-10-03 | End: 2019-11-02

## 2019-10-03 NOTE — TELEPHONE ENCOUNTER
IF she can get them to call us to verify that she is taking it and how much then I will consider it.   I need to know exactly when she is having surgery,

## 2019-10-03 NOTE — TELEPHONE ENCOUNTER
Contacted Alex at [facility] [facility] [employer] - [phone]. States has right radius fracture with an external fixator placed on 9/12/19. Last office visit was 9/24/19. Was given Norco/Acet 7.5/325 - 1 q 6 hours prn - last refill was 9/24/19 - #40.

## 2019-10-03 NOTE — TELEPHONE ENCOUNTER
Sheng Wright from [place] [address] [facility] called, they did surgery on pt and prescribed narcotics. Pt is up here in [place] now and needs a refill but the Dr at [address] will not prescribe narcotics over state lines.    They feel pt needs to come b

## 2019-10-03 NOTE — TELEPHONE ENCOUNTER
Patient advised of the information per . She will contact the office in [place] and have them call the office.

## 2019-10-03 NOTE — TELEPHONE ENCOUNTER
hydrocodone-acetaminophen (LORTAB 5) 5-500 MG Oral Tab     Patient was in [place] and broke her arm and then got an infection after surgery. The surgeon in [place] has been prescribing this medication for pain.  Surgeon cannot refill this across state l

## 2019-10-11 DIAGNOSIS — G25.81 RESTLESS LEGS SYNDROME (RLS): ICD-10-CM

## 2019-10-11 RX ORDER — ROPINIROLE 1 MG/1
1 TABLET, FILM COATED ORAL
Qty: 30 TABLET | Refills: 0 | Status: SHIPPED | OUTPATIENT
Start: 2019-10-11 | End: 2020-01-02

## 2019-10-11 RX ORDER — ROPINIROLE 1 MG/1
TABLET, FILM COATED ORAL
Qty: 90 TABLET | Refills: 0 | OUTPATIENT
Start: 2019-10-11

## 2019-10-11 NOTE — TELEPHONE ENCOUNTER
Pharmacy requesting a 90-day supply on medication refill sent today. Pt overdue for appointment, so 90-day supply is not appropriate. Refused.

## 2019-10-14 ENCOUNTER — MED REC SCAN ONLY (OUTPATIENT)
Dept: FAMILY MEDICINE CLINIC | Facility: CLINIC | Age: 59
End: 2019-10-14

## 2019-10-18 ENCOUNTER — TELEPHONE (OUTPATIENT)
Dept: FAMILY MEDICINE CLINIC | Facility: CLINIC | Age: 59
End: 2019-10-18

## 2019-10-18 NOTE — TELEPHONE ENCOUNTER
Dr. Teto Willard called to give an update on this pt, she is admitted at [facility]    Pt admitted for R wrist pain and she had an external fixation device with some purulent drainage.   Was taken to surgery and the device was removed and the pt is being treated

## 2019-10-30 DIAGNOSIS — G25.81 RESTLESS LEGS SYNDROME (RLS): ICD-10-CM

## 2019-10-31 ENCOUNTER — TELEPHONE (OUTPATIENT)
Dept: NEUROLOGY | Facility: CLINIC | Age: 59
End: 2019-10-31

## 2019-10-31 DIAGNOSIS — M79.7 FIBROMYALGIA: ICD-10-CM

## 2019-10-31 RX ORDER — LEVOTHYROXINE SODIUM 0.07 MG/1
TABLET ORAL
Qty: 90 TABLET | Refills: 1 | Status: SHIPPED | OUTPATIENT
Start: 2019-10-31 | End: 2020-02-01

## 2019-10-31 RX ORDER — CYCLOBENZAPRINE HCL 10 MG
TABLET ORAL
Qty: 60 TABLET | Refills: 0 | Status: SHIPPED | OUTPATIENT
Start: 2019-10-31 | End: 2019-11-29

## 2019-10-31 RX ORDER — ROPINIROLE 0.5 MG/1
TABLET, FILM COATED ORAL
Qty: 30 TABLET | Refills: 0 | OUTPATIENT
Start: 2019-10-31

## 2019-10-31 RX ORDER — LEVOTHYROXINE SODIUM 0.07 MG/1
TABLET ORAL
Qty: 30 TABLET | Refills: 1 | Status: SHIPPED | OUTPATIENT
Start: 2019-10-31 | End: 2019-10-31

## 2019-10-31 RX ORDER — GABAPENTIN 400 MG/1
CAPSULE ORAL
Qty: 120 CAPSULE | Refills: 0 | Status: SHIPPED | OUTPATIENT
Start: 2019-10-31 | End: 2019-11-29

## 2019-10-31 NOTE — TELEPHONE ENCOUNTER
Spoke with pt. States she takes 1mg of Ropinerole     Is overdue for appt. Verbalized understanding. Was just D/C from hospital recently for osteomyelitis and is on IV antibiotics. Is still coordinating appts with other providers. Will call back for appt.

## 2019-11-01 NOTE — TELEPHONE ENCOUNTER
Called pt, advised her  refilled script and is due for labs. Pt verbally understood with no further questions and stated she will also schedule for a flu shot.

## 2019-11-11 RX ORDER — VENLAFAXINE HYDROCHLORIDE 150 MG/1
CAPSULE, EXTENDED RELEASE ORAL
Qty: 90 CAPSULE | Refills: 0 | Status: SHIPPED | OUTPATIENT
Start: 2019-11-11 | End: 2020-03-09

## 2019-11-14 PROBLEM — S52.601P: Status: ACTIVE | Noted: 2019-11-14

## 2019-11-14 PROBLEM — S52.501P: Status: ACTIVE | Noted: 2019-11-14

## 2019-11-18 ENCOUNTER — TELEPHONE (OUTPATIENT)
Dept: FAMILY MEDICINE CLINIC | Facility: CLINIC | Age: 59
End: 2019-11-18

## 2019-11-18 DIAGNOSIS — Z23 NEED FOR VACCINATION: Primary | ICD-10-CM

## 2019-11-18 NOTE — TELEPHONE ENCOUNTER
Pt wants to get immunizarions. According to her [facility] chart it says that they gave them to her but they werent allowed to( per Dr. Jose Miguel Morrow- Infectious disease) bc she didn't want it messing with anitbiotics the constructed for her. .Please call pt back

## 2019-11-18 NOTE — TELEPHONE ENCOUNTER
Spoke with the pt and she states that she is in need of a couple vaccines    Flu and pneumovax- can you order? She is going to have another surgery on her hand-     She states that her picc line was just removed.        Pt is scheduled to come in for a [employer]

## 2019-11-18 NOTE — TELEPHONE ENCOUNTER
It's fine, orders placed, though she doesn't have one of the classic indications for the pneumovax, I'm fien doing it, will medicare cover it if not 65 and not asthmatic/CAD/CHF/etc?

## 2019-11-19 ENCOUNTER — NURSE ONLY (OUTPATIENT)
Dept: FAMILY MEDICINE CLINIC | Facility: CLINIC | Age: 59
End: 2019-11-19

## 2019-11-19 DIAGNOSIS — Z23 NEED FOR VACCINATION: ICD-10-CM

## 2019-11-19 DIAGNOSIS — E03.9 HYPOTHYROIDISM, UNSPECIFIED TYPE: ICD-10-CM

## 2019-11-19 PROCEDURE — G0008 ADMIN INFLUENZA VIRUS VAC: HCPCS | Performed by: FAMILY MEDICINE

## 2019-11-19 PROCEDURE — 86376 MICROSOMAL ANTIBODY EACH: CPT | Performed by: FAMILY MEDICINE

## 2019-11-19 PROCEDURE — 84443 ASSAY THYROID STIM HORMONE: CPT | Performed by: FAMILY MEDICINE

## 2019-11-19 PROCEDURE — 90686 IIV4 VACC NO PRSV 0.5 ML IM: CPT | Performed by: FAMILY MEDICINE

## 2019-11-19 PROCEDURE — 86800 THYROGLOBULIN ANTIBODY: CPT | Performed by: FAMILY MEDICINE

## 2019-11-19 NOTE — PROGRESS NOTES
Gold and mint tubes drawn from left arm with 23g butterfly needle x1. Pt cinthia well.  Pt left the clinic in stable condition

## 2019-11-21 ENCOUNTER — TELEPHONE (OUTPATIENT)
Dept: FAMILY MEDICINE CLINIC | Facility: CLINIC | Age: 59
End: 2019-11-21

## 2019-11-21 PROBLEM — E03.8 HYPOTHYROIDISM DUE TO HASHIMOTO'S THYROIDITIS: Status: ACTIVE | Noted: 2019-11-21

## 2019-11-21 PROBLEM — E06.3 HYPOTHYROIDISM DUE TO HASHIMOTO'S THYROIDITIS: Status: ACTIVE | Noted: 2019-11-21

## 2019-11-22 ENCOUNTER — TELEPHONE (OUTPATIENT)
Dept: FAMILY MEDICINE CLINIC | Facility: CLINIC | Age: 59
End: 2019-11-22

## 2019-11-22 DIAGNOSIS — E03.9 HYPOTHYROIDISM, UNSPECIFIED TYPE: Primary | ICD-10-CM

## 2019-11-22 NOTE — TELEPHONE ENCOUNTER
----- Message from Yodit Rodriguez MD sent at 11/21/2019 12:03 PM CST -----  Thyroid is in range, she's on correct dose of medication, continue her current dose of thyroid medication and recheck TSH with reflex in 6 months.

## 2019-11-25 NOTE — TELEPHONE ENCOUNTER
Spoke with the pt and advised of the lab results and the recommendations- she v/u      Recall entered for 6 months and future order placed  Pt requesting the echo order be faxed to [facility], located within [facility]

## 2019-11-26 ENCOUNTER — MED REC SCAN ONLY (OUTPATIENT)
Dept: FAMILY MEDICINE CLINIC | Facility: CLINIC | Age: 59
End: 2019-11-26

## 2019-11-29 DIAGNOSIS — M79.7 FIBROMYALGIA: ICD-10-CM

## 2019-11-30 NOTE — TELEPHONE ENCOUNTER
She's been seeing ortho since her surgery/infection, are they managing pain meds for her now?   Need to find out please, thanks

## 2019-11-30 NOTE — TELEPHONE ENCOUNTER
Cyclobenzaprine last refilled 10/31/19 with 0 refills for #90  Tramadol last refilled 9/10/19 with 0 refills for #60  Meloxicam last refilled 8/12/19 with 3 refills for #30  Last OV 2/26/19   No future appt

## 2019-12-02 RX ORDER — TRAMADOL HYDROCHLORIDE 50 MG/1
TABLET ORAL
Qty: 60 TABLET | Refills: 0 | Status: SHIPPED | OUTPATIENT
Start: 2019-12-02 | End: 2020-02-14

## 2019-12-02 RX ORDER — MELOXICAM 15 MG/1
TABLET ORAL
Qty: 90 TABLET | Refills: 0 | Status: SHIPPED | OUTPATIENT
Start: 2019-12-02 | End: 2020-05-11

## 2019-12-02 RX ORDER — CYCLOBENZAPRINE HCL 10 MG
TABLET ORAL
Qty: 60 TABLET | Refills: 0 | Status: SHIPPED | OUTPATIENT
Start: 2019-12-02 | End: 2019-12-30

## 2019-12-02 NOTE — TELEPHONE ENCOUNTER
Pt states ortho surgeon is not prescribing any pain meds and she is not seeing him anymore. She states he advised pt should have Dr. Milagros Herrera take over meds. She has an appt with Dr. Casey Oquendo in December.     Tramadol only uses when absolutely needed, s

## 2019-12-03 RX ORDER — GABAPENTIN 400 MG/1
CAPSULE ORAL
Qty: 120 CAPSULE | Refills: 1 | Status: SHIPPED | OUTPATIENT
Start: 2019-12-03 | End: 2020-01-29

## 2019-12-03 NOTE — TELEPHONE ENCOUNTER
[place].     Can try applying otc cortisone cream TID until our next office visit at which time we can further assess as needed

## 2019-12-13 ENCOUNTER — OFFICE VISIT (OUTPATIENT)
Dept: FAMILY MEDICINE CLINIC | Facility: CLINIC | Age: 59
End: 2019-12-13
Payer: MEDICARE

## 2019-12-13 ENCOUNTER — TELEPHONE (OUTPATIENT)
Dept: FAMILY MEDICINE CLINIC | Facility: CLINIC | Age: 59
End: 2019-12-13

## 2019-12-13 VITALS
DIASTOLIC BLOOD PRESSURE: 84 MMHG | RESPIRATION RATE: 20 BRPM | SYSTOLIC BLOOD PRESSURE: 136 MMHG | BODY MASS INDEX: 41 KG/M2 | TEMPERATURE: 97 F | WEIGHT: 286.19 LBS | HEART RATE: 116 BPM | OXYGEN SATURATION: 98 %

## 2019-12-13 DIAGNOSIS — M86.231: ICD-10-CM

## 2019-12-13 DIAGNOSIS — J06.9 VIRAL UPPER RESPIRATORY TRACT INFECTION: ICD-10-CM

## 2019-12-13 DIAGNOSIS — Z12.31 SCREENING MAMMOGRAM, ENCOUNTER FOR: ICD-10-CM

## 2019-12-13 DIAGNOSIS — H65.93 BILATERAL OTITIS MEDIA WITH EFFUSION: Primary | ICD-10-CM

## 2019-12-13 PROBLEM — M15.9 PRIMARY OSTEOARTHRITIS INVOLVING MULTIPLE JOINTS: Status: ACTIVE | Noted: 2019-10-15

## 2019-12-13 PROBLEM — G25.81 RESTLESS LEGS SYNDROME: Status: ACTIVE | Noted: 2019-10-15

## 2019-12-13 PROBLEM — G89.29 CHRONIC PAIN OF LEFT KNEE: Status: RESOLVED | Noted: 2017-08-31 | Resolved: 2019-12-13

## 2019-12-13 PROBLEM — M25.562 CHRONIC PAIN OF LEFT KNEE: Status: RESOLVED | Noted: 2017-08-31 | Resolved: 2019-12-13

## 2019-12-13 PROBLEM — Z86.010 HX OF ADENOMATOUS POLYP OF COLON: Status: ACTIVE | Noted: 2019-10-10

## 2019-12-13 PROCEDURE — 99214 OFFICE O/P EST MOD 30 MIN: CPT | Performed by: FAMILY MEDICINE

## 2019-12-13 RX ORDER — DOXYCYCLINE HYCLATE 100 MG/1
100 CAPSULE ORAL 2 TIMES DAILY
Qty: 14 CAPSULE | Refills: 0 | Status: SHIPPED | OUTPATIENT
Start: 2019-12-13 | End: 2019-12-20

## 2019-12-13 NOTE — TELEPHONE ENCOUNTER
Spoke iwht the pt and she states that her right ear has been hurting since last night- she presses underneath it and helps the pain. Sinus pressure x a couple days      Yellow nasal discharge- for a couple days.    Not taking anything OTC- no thermometer- s

## 2019-12-13 NOTE — TELEPHONE ENCOUNTER
PT CALLED AND ADV GOING TO [place] FOR BLOOD WORK AND IS LOOKING TO SEE ABOUT BEING SEEN TODAY IF POSSIBLE.     PT HAS NOT BEEN FEELING WELL, THINKS MAY HAVE AN EAR INFECTION    PLEASE CALL AND ADV    THANK YOU

## 2019-12-13 NOTE — PROGRESS NOTES
Rosanne Herrera is a 64year old female. HPI:   Patient updates me she had bi knee injections last night for the OA, but is planning on knee replacement as soon as wrist is healed up.     She tells me 9/12/19 she had R wrist surgery in [place] (a AS NEEDED 90 tablet 0   • TRAMADOL HCL 50 MG Oral Tab TAKE 1 TABLET BY MOUTH TWICE DAILY AS NEEDED 60 tablet 0   • VENLAFAXINE HCL  MG Oral Capsule SR 24 Hr TAKE 1 CAPSULE BY MOUTH DAILY 90 capsule 0   • LEVOTHYROXINE SODIUM 75 MCG Oral Tab TAKE 1 TA • OTHER SURGICAL HISTORY  2010    L carpal tunnel    • WRIST FRACTURE SURGERY        Family History   Problem Relation Age of Onset   • Breast Cancer Mother         unsure if actually breast cancer      Social History    Tobacco Use      Smoking status: feels like she's in good hands now  Other orders  -     Doxycycline Hyclate 100 MG Oral Cap; Take 1 capsule (100 mg total) by mouth 2 (two) times daily for 7 days. The patient indicates understanding of these issues and agrees to the plan.

## 2019-12-23 DIAGNOSIS — G25.81 RESTLESS LEGS SYNDROME (RLS): ICD-10-CM

## 2019-12-30 ENCOUNTER — MED REC SCAN ONLY (OUTPATIENT)
Dept: FAMILY MEDICINE CLINIC | Facility: CLINIC | Age: 59
End: 2019-12-30

## 2019-12-30 RX ORDER — CYCLOBENZAPRINE HCL 10 MG
TABLET ORAL
Qty: 60 TABLET | Refills: 0 | Status: SHIPPED | OUTPATIENT
Start: 2019-12-30 | End: 2020-02-01

## 2019-12-30 NOTE — TELEPHONE ENCOUNTER
Last refilled on 12/2/19 for # 60 with 0 rf. Last seen on 12/13/19.      Future Appointments   Date Time Provider Mago Lisa   1/13/2020 11:20 AM ROSEMARIE VILLALOBOS RM1 Alcario Christopher Calderon   1/13/2020  2:20 PM MD KAREN Fontaine        Thank

## 2020-01-02 RX ORDER — ROPINIROLE 1 MG/1
TABLET, FILM COATED ORAL
Qty: 30 TABLET | Refills: 0 | Status: SHIPPED | OUTPATIENT
Start: 2020-01-02 | End: 2020-02-19

## 2020-01-06 ENCOUNTER — TELEPHONE (OUTPATIENT)
Dept: FAMILY MEDICINE CLINIC | Facility: CLINIC | Age: 60
End: 2020-01-06

## 2020-01-06 DIAGNOSIS — I34.1 MVP (MITRAL VALVE PROLAPSE): Primary | ICD-10-CM

## 2020-01-06 NOTE — TELEPHONE ENCOUNTER
Pt called regarding her heart doppler she would like it to be done at [facility]. Please advise and call back.  Thanks

## 2020-01-06 NOTE — TELEPHONE ENCOUNTER
Spoke with the pt and she is requesting an order for the Doppler- she states that she gets one every year.  Advised that there is an order in the chart but it is old and she will need a new order- she v/u  I asked her if she needs the number to [facility]

## 2020-01-13 ENCOUNTER — MED REC SCAN ONLY (OUTPATIENT)
Dept: FAMILY MEDICINE CLINIC | Facility: CLINIC | Age: 60
End: 2020-01-13

## 2020-01-20 ENCOUNTER — PATIENT OUTREACH (OUTPATIENT)
Dept: FAMILY MEDICINE CLINIC | Facility: CLINIC | Age: 60
End: 2020-01-20

## 2020-01-29 DIAGNOSIS — M79.7 FIBROMYALGIA: ICD-10-CM

## 2020-01-30 RX ORDER — GABAPENTIN 400 MG/1
CAPSULE ORAL
Qty: 120 CAPSULE | Refills: 0 | Status: SHIPPED | OUTPATIENT
Start: 2020-01-30 | End: 2020-02-19

## 2020-02-01 RX ORDER — CYCLOBENZAPRINE HCL 10 MG
TABLET ORAL
Qty: 60 TABLET | Refills: 0 | Status: SHIPPED | OUTPATIENT
Start: 2020-02-01 | End: 2020-03-09

## 2020-02-01 RX ORDER — LEVOTHYROXINE SODIUM 0.07 MG/1
TABLET ORAL
Qty: 90 TABLET | Refills: 1 | Status: SHIPPED | OUTPATIENT
Start: 2020-02-01 | End: 2020-10-05

## 2020-02-01 NOTE — TELEPHONE ENCOUNTER
Levothyroxine filled per protocol. Cyclobenzaprine does not have a refill. Last refill: 12/30/2019 #60 with 0 refills  Last Visit: 12/13/2019  Next Visit: No future appointments. Forward to Dr. Alyce Nieto please advise on refills. Thanks.

## 2020-02-03 ENCOUNTER — TELEPHONE (OUTPATIENT)
Dept: FAMILY MEDICINE CLINIC | Facility: CLINIC | Age: 60
End: 2020-02-03

## 2020-02-03 NOTE — TELEPHONE ENCOUNTER
Left message for the tp that we received a request for some hydrocortissone cream from [facility] [address] in message that we have not filled this previously and need to know if she requested this    Asked her to call back

## 2020-02-04 ENCOUNTER — MED REC SCAN ONLY (OUTPATIENT)
Dept: FAMILY MEDICINE CLINIC | Facility: CLINIC | Age: 60
End: 2020-02-04

## 2020-02-14 RX ORDER — TRAMADOL HYDROCHLORIDE 50 MG/1
TABLET ORAL
Qty: 60 TABLET | Refills: 0 | Status: SHIPPED | OUTPATIENT
Start: 2020-02-14 | End: 2020-04-07

## 2020-02-19 ENCOUNTER — OFFICE VISIT (OUTPATIENT)
Dept: NEUROLOGY | Facility: CLINIC | Age: 60
End: 2020-02-19
Payer: MEDICARE

## 2020-02-19 VITALS
HEART RATE: 78 BPM | RESPIRATION RATE: 16 BRPM | SYSTOLIC BLOOD PRESSURE: 130 MMHG | WEIGHT: 288 LBS | DIASTOLIC BLOOD PRESSURE: 70 MMHG | BODY MASS INDEX: 41 KG/M2

## 2020-02-19 DIAGNOSIS — M79.7 FIBROMYALGIA: ICD-10-CM

## 2020-02-19 DIAGNOSIS — G60.8 PERIPHERAL SENSORY NEUROPATHY: Primary | ICD-10-CM

## 2020-02-19 DIAGNOSIS — G25.81 RESTLESS LEGS SYNDROME (RLS): ICD-10-CM

## 2020-02-19 PROCEDURE — 99213 OFFICE O/P EST LOW 20 MIN: CPT | Performed by: OTHER

## 2020-02-19 RX ORDER — GABAPENTIN 400 MG/1
CAPSULE ORAL
Qty: 120 CAPSULE | Refills: 5 | Status: SHIPPED | OUTPATIENT
Start: 2020-02-19 | End: 2020-08-12

## 2020-02-19 RX ORDER — ROPINIROLE 2 MG/1
2 TABLET, FILM COATED ORAL NIGHTLY
Qty: 30 TABLET | Refills: 5 | Status: SHIPPED | OUTPATIENT
Start: 2020-02-19 | End: 2020-08-06

## 2020-02-20 NOTE — PROGRESS NOTES
HPI:    Patient ID: Lyn Medley is a 64year old female. HPI    Patient presented for follow-up for peripheral sensory neuropathy, carpal tunnel syndrome and restless leg syndrome. Hx of unspecified autoimmune disease and prediabetes.  Sta Musculoskeletal: Positive for arthralgias and gait problem. Allergic/Immunologic: Negative. Neurological: Positive for numbness. Hematological: Negative. Psychiatric/Behavioral: Positive for sleep disturbance.    All other systems reviewed and a rhythm and normal heart sounds. Pulmonary/Chest: Effort normal and breath sounds normal.   Abdominal: Soft. Bowel sounds are normal.   Skin: Skin is warm and dry. Psychiatric: normal mood and affect.    Neurological  Mental status: Awake, alert and orie

## 2020-02-28 ENCOUNTER — MED REC SCAN ONLY (OUTPATIENT)
Dept: FAMILY MEDICINE CLINIC | Facility: CLINIC | Age: 60
End: 2020-02-28

## 2020-03-03 ENCOUNTER — HOSPITAL ENCOUNTER (OUTPATIENT)
Dept: MAMMOGRAPHY | Age: 60
Discharge: HOME | End: 2020-03-03
Attending: FAMILY MEDICINE
Payer: MEDICARE

## 2020-03-03 ENCOUNTER — OFFICE VISIT (OUTPATIENT)
Dept: FAMILY MEDICINE CLINIC | Facility: CLINIC | Age: 60
End: 2020-03-03
Payer: MEDICARE

## 2020-03-03 VITALS
RESPIRATION RATE: 14 BRPM | SYSTOLIC BLOOD PRESSURE: 120 MMHG | HEIGHT: 67 IN | WEIGHT: 286 LBS | DIASTOLIC BLOOD PRESSURE: 80 MMHG | BODY MASS INDEX: 44.89 KG/M2 | HEART RATE: 72 BPM | TEMPERATURE: 96 F

## 2020-03-03 DIAGNOSIS — Z86.010 HX OF ADENOMATOUS POLYP OF COLON: ICD-10-CM

## 2020-03-03 DIAGNOSIS — F32.A DEPRESSIVE DISORDER: ICD-10-CM

## 2020-03-03 DIAGNOSIS — S52.501P: ICD-10-CM

## 2020-03-03 DIAGNOSIS — Z00.00 ENCOUNTER FOR ANNUAL HEALTH EXAMINATION: Primary | ICD-10-CM

## 2020-03-03 DIAGNOSIS — M17.0 PRIMARY OSTEOARTHRITIS OF BOTH KNEES: ICD-10-CM

## 2020-03-03 DIAGNOSIS — E06.3 HYPOTHYROIDISM DUE TO HASHIMOTO'S THYROIDITIS: ICD-10-CM

## 2020-03-03 DIAGNOSIS — M15.9 PRIMARY OSTEOARTHRITIS INVOLVING MULTIPLE JOINTS: ICD-10-CM

## 2020-03-03 DIAGNOSIS — M79.7 FIBROMYALGIA: ICD-10-CM

## 2020-03-03 DIAGNOSIS — I34.1 MVP (MITRAL VALVE PROLAPSE): ICD-10-CM

## 2020-03-03 DIAGNOSIS — M86.231: ICD-10-CM

## 2020-03-03 DIAGNOSIS — M51.36 DDD (DEGENERATIVE DISC DISEASE), LUMBAR: ICD-10-CM

## 2020-03-03 DIAGNOSIS — G25.81 RESTLESS LEGS SYNDROME: ICD-10-CM

## 2020-03-03 DIAGNOSIS — F41.9 ANXIETY: ICD-10-CM

## 2020-03-03 DIAGNOSIS — R73.9 ELEVATED BLOOD SUGAR: ICD-10-CM

## 2020-03-03 DIAGNOSIS — M54.17 RADICULOPATHY, LUMBOSACRAL REGION: ICD-10-CM

## 2020-03-03 DIAGNOSIS — E55.9 VITAMIN D DEFICIENCY: ICD-10-CM

## 2020-03-03 DIAGNOSIS — I10 ESSENTIAL HYPERTENSION: ICD-10-CM

## 2020-03-03 DIAGNOSIS — S52.601P: ICD-10-CM

## 2020-03-03 DIAGNOSIS — M47.812 OSTEOARTHRITIS OF CERVICAL SPINE, UNSPECIFIED SPINAL OSTEOARTHRITIS COMPLICATION STATUS: ICD-10-CM

## 2020-03-03 DIAGNOSIS — G56.03 BILATERAL CARPAL TUNNEL SYNDROME: ICD-10-CM

## 2020-03-03 DIAGNOSIS — E03.8 HYPOTHYROIDISM DUE TO HASHIMOTO'S THYROIDITIS: ICD-10-CM

## 2020-03-03 DIAGNOSIS — G43.709 CHRONIC MIGRAINE WITHOUT AURA WITHOUT STATUS MIGRAINOSUS, NOT INTRACTABLE: ICD-10-CM

## 2020-03-03 DIAGNOSIS — Z12.31 SCREENING MAMMOGRAM, ENCOUNTER FOR: ICD-10-CM

## 2020-03-03 DIAGNOSIS — Z86.39 PERSONAL HISTORY OF OTHER ENDOCRINE, NUTRITIONAL AND METABOLIC DISEASE: ICD-10-CM

## 2020-03-03 LAB
ALBUMIN SERPL-MCNC: 3.4 G/DL (ref 3.4–5)
ALBUMIN/GLOB SERPL: 0.9 {RATIO} (ref 1–2)
ALP LIVER SERPL-CCNC: 131 U/L (ref 46–118)
ALT SERPL-CCNC: 23 U/L (ref 13–56)
ANION GAP SERPL CALC-SCNC: 4 MMOL/L (ref 0–18)
AST SERPL-CCNC: 17 U/L (ref 15–37)
BASOPHILS # BLD AUTO: 0.06 X10(3) UL (ref 0–0.2)
BASOPHILS NFR BLD AUTO: 1.2 %
BILIRUB SERPL-MCNC: 0.7 MG/DL (ref 0.1–2)
BUN BLD-MCNC: 24 MG/DL (ref 7–18)
BUN/CREAT SERPL: 23.1 (ref 10–20)
CALCIUM BLD-MCNC: 8.8 MG/DL (ref 8.5–10.1)
CHLORIDE SERPL-SCNC: 102 MMOL/L (ref 98–112)
CHOLEST SMN-MCNC: 200 MG/DL (ref ?–200)
CO2 SERPL-SCNC: 28 MMOL/L (ref 21–32)
CREAT BLD-MCNC: 1.04 MG/DL (ref 0.55–1.02)
DEPRECATED HBV CORE AB SER IA-ACNC: 59.3 NG/ML (ref 18–340)
DEPRECATED RDW RBC AUTO: 41.6 FL (ref 35.1–46.3)
EOSINOPHIL # BLD AUTO: 0.13 X10(3) UL (ref 0–0.7)
EOSINOPHIL NFR BLD AUTO: 2.5 %
ERYTHROCYTE [DISTWIDTH] IN BLOOD BY AUTOMATED COUNT: 11.9 % (ref 11–15)
GLOBULIN PLAS-MCNC: 4 G/DL (ref 2.8–4.4)
GLUCOSE BLD-MCNC: 102 MG/DL (ref 70–99)
HCT VFR BLD AUTO: 38.6 % (ref 35–48)
HDLC SERPL-MCNC: 43 MG/DL (ref 40–59)
HGB BLD-MCNC: 12.9 G/DL (ref 12–16)
IMM GRANULOCYTES # BLD AUTO: 0 X10(3) UL (ref 0–1)
IMM GRANULOCYTES NFR BLD: 0 %
LDLC SERPL CALC-MCNC: 89 MG/DL (ref ?–100)
LYMPHOCYTES # BLD AUTO: 2.01 X10(3) UL (ref 1–4)
LYMPHOCYTES NFR BLD AUTO: 39.4 %
M PROTEIN MFR SERPL ELPH: 7.4 G/DL (ref 6.4–8.2)
MCH RBC QN AUTO: 31.7 PG (ref 26–34)
MCHC RBC AUTO-ENTMCNC: 33.4 G/DL (ref 31–37)
MCV RBC AUTO: 94.8 FL (ref 80–100)
MONOCYTES # BLD AUTO: 0.57 X10(3) UL (ref 0.1–1)
MONOCYTES NFR BLD AUTO: 11.2 %
NEUTROPHILS # BLD AUTO: 2.33 X10 (3) UL (ref 1.5–7.7)
NEUTROPHILS # BLD AUTO: 2.33 X10(3) UL (ref 1.5–7.7)
NEUTROPHILS NFR BLD AUTO: 45.7 %
NONHDLC SERPL-MCNC: 157 MG/DL (ref ?–130)
OSMOLALITY SERPL CALC.SUM OF ELEC: 282 MOSM/KG (ref 275–295)
PATIENT FASTING Y/N/NP: NO
PATIENT FASTING Y/N/NP: NO
PLATELET # BLD AUTO: 192 10(3)UL (ref 150–450)
POTASSIUM SERPL-SCNC: 4.3 MMOL/L (ref 3.5–5.1)
RBC # BLD AUTO: 4.07 X10(6)UL (ref 3.8–5.3)
SODIUM SERPL-SCNC: 134 MMOL/L (ref 136–145)
TRIGL SERPL-MCNC: 338 MG/DL (ref 30–149)
VIT D+METAB SERPL-MCNC: 21.8 NG/ML (ref 30–100)
VLDLC SERPL CALC-MCNC: 68 MG/DL (ref 0–30)
WBC # BLD AUTO: 5.1 X10(3) UL (ref 4–11)

## 2020-03-03 PROCEDURE — 85025 COMPLETE CBC W/AUTO DIFF WBC: CPT | Performed by: FAMILY MEDICINE

## 2020-03-03 PROCEDURE — G0439 PPPS, SUBSEQ VISIT: HCPCS | Performed by: FAMILY MEDICINE

## 2020-03-03 PROCEDURE — 80053 COMPREHEN METABOLIC PANEL: CPT | Performed by: FAMILY MEDICINE

## 2020-03-03 PROCEDURE — 77067 SCR MAMMO BI INCL CAD: CPT | Performed by: FAMILY MEDICINE

## 2020-03-03 PROCEDURE — 83036 HEMOGLOBIN GLYCOSYLATED A1C: CPT | Performed by: FAMILY MEDICINE

## 2020-03-03 PROCEDURE — 82728 ASSAY OF FERRITIN: CPT | Performed by: FAMILY MEDICINE

## 2020-03-03 PROCEDURE — 80061 LIPID PANEL: CPT | Performed by: FAMILY MEDICINE

## 2020-03-03 PROCEDURE — 82306 VITAMIN D 25 HYDROXY: CPT | Performed by: FAMILY MEDICINE

## 2020-03-03 NOTE — PROGRESS NOTES
HPI:   Aleida Paul is a 64year old female who presents for a Medicare Subsequent Annual Wellness visit (Pt already had Initial Annual Wellness).     Patient has f/u with Dr. Precious Chirinos ortho this Thursday, cast coming off, put in splint and th Mima Barlow was screened for Alcohol abuse and had a score of 0 so is at low risk.     Patient Care Team: Patient Care Team:  Syd Moreau MD as PCP - General (Family Practice)  Syd Moreau MD [facility]  Moraima Beltran MD as Consulting Physici CAPSULE IN THE AFTERNOON AND 2 CAPSULES EVERY EVENING  TRAMADOL HCL 50 MG Oral Tab, TAKE 1 TABLET BY MOUTH TWICE DAILY AS NEEDED  CYCLOBENZAPRINE 10 MG Oral Tab, TAKE 1 TABLET BY MOUTH TWICE DAILY AS NEEDED FOR MUSCLE SPASMS  LEVOTHYROXINE SODIUM 75 MCG Or 96.4 °F (35.8 °C) (Tympanic)   Resp 14   Ht 67\"   Wt 286 lb (129.7 kg)   BMI 44.79 kg/m²  Estimated body mass index is 44.79 kg/m² as calculated from the following:    Height as of this encounter: 67\". Weight as of this encounter: 286 lb (129.7 kg). Prefilled syringe (11720) 02/26/2019, 11/19/2019        ASSESSMENT AND OTHER RELEVANT CHRONIC CONDITIONS:   Lyn Medley is a 64year old female who presents for a Medicare Assessment.      PLAN SUMMARY:   Diagnoses and all orders for this visi 2D DOPPLER (CPT=93306); Future  -     CBC WITH DIFFERENTIAL WITH PLATELET; Future  -     COMP METABOLIC PANEL (14); Future  -     HEMOGLOBIN A1C; Future  -     LIPID PANEL;  Future  -     VITAMIN D, 25-HYDROXY; Future  -     VENIPUNCTURE  -     CBC W/ DIFFE Future  -     LIPID PANEL;  Future  -     VITAMIN D, 25-HYDROXY; Future  -     VENIPUNCTURE  -     CBC W/ DIFFERENTIAL    Depressive disorder  -spent much of visit discussing this and after our talk she reports feeling so much better and plans on looking in Date Value   06/04/2013 133 (H)        EKG - w/ Initial Preventative Physical Exam only, or if medically necessary Electrocardiogram date       Colorectal Cancer Screening      Colonoscopy Screen every 10 years Colonoscopy due on 06/10/2019 Update Health metal- TD and TDaP Not covered by Medicare Part B No vaccine history found This may be covered with your prescription benefits, but Medicare does not cover unless Medically needed    Zoster  Not covered by Medicare Part B No vaccine history found This may

## 2020-03-03 NOTE — PATIENT INSTRUCTIONS
[facility]'s SCREENING SCHEDULE   Tests on this list are recommended by your physician but may not be covered, or covered at this frequency, by your insurer. Please check with your insurance carrier before scheduling to verify coverage. previous visit.  Limited to patients who meet one of the following criteria:   • Men who are 73-68 years old and have smoked more than 100 cigarettes in their lifetime   • Anyone with a family history    Colorectal Cancer Screening  Covered up to Age 76 Influenza  Covered Annually No orders found for this or any previous visit. Please get every year    Pneumococcal 13 (Prevnar)  Covered Once after 65 No orders found for this or any previous visit.  Please get once after your 65th birthday    Pneumococcal 2

## 2020-03-04 LAB
EST. AVERAGE GLUCOSE BLD GHB EST-MCNC: 128 MG/DL (ref 68–126)
HBA1C MFR BLD HPLC: 6.1 % (ref ?–5.7)

## 2020-03-07 RX ORDER — MULTIVIT-MIN/IRON/FOLIC ACID/K 18-600-40
2000 CAPSULE ORAL DAILY
Qty: 90 CAPSULE | Refills: 3 | Status: SHIPPED | OUTPATIENT
Start: 2020-03-07 | End: 2021-06-27

## 2020-03-09 RX ORDER — CYCLOBENZAPRINE HCL 10 MG
TABLET ORAL
Qty: 60 TABLET | Refills: 0 | Status: SHIPPED | OUTPATIENT
Start: 2020-03-09 | End: 2020-04-08

## 2020-03-09 RX ORDER — VENLAFAXINE HYDROCHLORIDE 150 MG/1
CAPSULE, EXTENDED RELEASE ORAL
Qty: 90 CAPSULE | Refills: 0 | Status: SHIPPED | OUTPATIENT
Start: 2020-03-09 | End: 2020-06-07

## 2020-03-09 NOTE — TELEPHONE ENCOUNTER
Routing to provider per protocol. Cyclobenzaprine last refilled on 2/1/20 for # 60 with 0 rf. Venlafaxine last refilled on 11/11/19 for # 90 with 0 rf. Last seen on 3/3/20.      Future Appointments   Date Time Provider Mago Gonzalez   3/17/2020

## 2020-03-11 ENCOUNTER — TELEPHONE (OUTPATIENT)
Dept: FAMILY MEDICINE CLINIC | Facility: CLINIC | Age: 60
End: 2020-03-11

## 2020-03-11 NOTE — TELEPHONE ENCOUNTER
PT CALLED AND DOES NOT HAVE MYCHART AND WOULD LIKE SOMEONE TO GO OVER TEST RESULTS FROM 03/03/20   PLEASE CALL AND ADV    THAN Jeffery Thomas

## 2020-03-16 ENCOUNTER — MED REC SCAN ONLY (OUTPATIENT)
Dept: FAMILY MEDICINE CLINIC | Facility: CLINIC | Age: 60
End: 2020-03-16

## 2020-03-17 ENCOUNTER — OFFICE VISIT (OUTPATIENT)
Dept: SURGERY | Facility: CLINIC | Age: 60
End: 2020-03-17
Payer: MEDICARE

## 2020-03-17 VITALS — HEART RATE: 69 BPM | BODY MASS INDEX: 44.89 KG/M2 | WEIGHT: 286 LBS | HEIGHT: 67 IN | TEMPERATURE: 98 F

## 2020-03-17 DIAGNOSIS — K43.9 VENTRAL HERNIA WITHOUT OBSTRUCTION OR GANGRENE: Primary | ICD-10-CM

## 2020-03-17 PROCEDURE — 99204 OFFICE O/P NEW MOD 45 MIN: CPT | Performed by: SURGERY

## 2020-03-17 NOTE — H&P
Aleida Paul is a 64year old female  Patient presents with:  Hernia: New patient referred by Dr. Ruel Jay for hernia consult. c/o stabbing pain above belly button. hx of hernia repair 2003.       REFERRED BY    Patient presents with complaints of MOUTH EVERY MORNING, 1 CAPSULE IN THE AFTERNOON AND 2 CAPSULES EVERY EVENING, Disp: 120 capsule, Rfl: 5  TRAMADOL HCL 50 MG Oral Tab, TAKE 1 TABLET BY MOUTH TWICE DAILY AS NEEDED, Disp: 60 tablet, Rfl: 0  LEVOTHYROXINE SODIUM 75 MCG Oral Tab, TAKE 1 TABLET extremities  HEMATOLOGY: denies hx anemia; denies bruising or excessive bleeding  Endocrine: no weight gain or loss no hot or cold intolerance    EXAM     Pulse 69, temperature 97.8 °F (36.6 °C), temperature source Temporal, height 67\", weight 286 lb (129 mg/dL Final   • Total Protein 03/03/2020 7.4  6.4 - 8.2 g/dL Final   • Albumin 03/03/2020 3.4  3.4 - 5.0 g/dL Final   • Globulin  03/03/2020 4.0  2.8 - 4.4 g/dL Final   • A/G Ratio 03/03/2020 0.9* 1.0 - 2.0 Final   • FASTING 03/03/2020 No   Final   • HgbA1 ng/mL      Insufficiency    ng/mL      Sufficiency   >100   ng/mL      Toxicity    *Clinical controversy exists regarding optimal 25(OH)D levels. Emerging evidence links potential adverse effects to high levels, particularly >60 ng/mL.        • Shantel incarcerated  PLan: Open ventral herniorrhaphy with mesh discussed with patient risks of surgery to include bleeding infection pneumonia DVT and recurrence of the hernia.       Meds & Refills for this Visit:  Requested Prescriptions      No prescriptions re

## 2020-04-06 ENCOUNTER — HOSPITAL ENCOUNTER (OUTPATIENT)
Dept: MAMMOGRAPHY | Facility: HOSPITAL | Age: 60
Discharge: HOME | End: 2020-04-06
Attending: FAMILY MEDICINE
Payer: MEDICARE

## 2020-04-06 ENCOUNTER — TELEPHONE (OUTPATIENT)
Dept: SURGERY | Facility: CLINIC | Age: 60
End: 2020-04-06

## 2020-04-06 DIAGNOSIS — K43.9 VENTRAL HERNIA WITHOUT OBSTRUCTION OR GANGRENE: Primary | ICD-10-CM

## 2020-04-06 DIAGNOSIS — R92.2 INCONCLUSIVE MAMMOGRAM: ICD-10-CM

## 2020-04-06 PROCEDURE — 77062 BREAST TOMOSYNTHESIS BI: CPT | Performed by: FAMILY MEDICINE

## 2020-04-06 PROCEDURE — 77066 DX MAMMO INCL CAD BI: CPT | Performed by: FAMILY MEDICINE

## 2020-04-06 PROCEDURE — 76642 ULTRASOUND BREAST LIMITED: CPT | Performed by: FAMILY MEDICINE

## 2020-04-07 ENCOUNTER — TELEPHONE (OUTPATIENT)
Dept: FAMILY MEDICINE CLINIC | Facility: CLINIC | Age: 60
End: 2020-04-07

## 2020-04-07 RX ORDER — TRAMADOL HYDROCHLORIDE 50 MG/1
50 TABLET ORAL 2 TIMES DAILY PRN
Qty: 60 TABLET | Refills: 0 | Status: SHIPPED | OUTPATIENT
Start: 2020-04-07 | End: 2020-06-07

## 2020-04-07 NOTE — TELEPHONE ENCOUNTER
Script sent for the tramadol. Thanks for the update on her surgeon's recs. She has expressed her concerns with me regarding her prior health care before. Agree with advice that recs for when to do c-scope to come from her GI.

## 2020-04-07 NOTE — TELEPHONE ENCOUNTER
Pt would like to talk to nurse about her results for her Breast US.    Also pt would like to know if [facility] can help her with the pain management for her wrist. She is not having the  Surgery to remove the third plate in her wrist.   Please return call to [phone]

## 2020-04-07 NOTE — TELEPHONE ENCOUNTER
Patient states she got the results of her breast US already. States she has to get other testing done. Was told everything she had in her past goes hand. States her history of uterine cancer could lead to colon cancer. Needs a repeat colonoscopy.  Was t

## 2020-04-08 RX ORDER — CYCLOBENZAPRINE HCL 10 MG
TABLET ORAL
Qty: 60 TABLET | Refills: 0 | Status: SHIPPED | OUTPATIENT
Start: 2020-04-08 | End: 2020-05-11

## 2020-04-08 NOTE — TELEPHONE ENCOUNTER
Last refilled on 3/9/20 for # 60 with 0 refills  Last OV 3/3/20 for px  No future appointments. Thank you.

## 2020-04-09 ENCOUNTER — TELEPHONE (OUTPATIENT)
Dept: FAMILY MEDICINE CLINIC | Facility: CLINIC | Age: 60
End: 2020-04-09

## 2020-04-09 DIAGNOSIS — M54.2 NECK PAIN: ICD-10-CM

## 2020-04-09 DIAGNOSIS — R51.9 FACE PAIN: Primary | ICD-10-CM

## 2020-04-09 DIAGNOSIS — R51.9 NONINTRACTABLE HEADACHE, UNSPECIFIED CHRONICITY PATTERN, UNSPECIFIED HEADACHE TYPE: ICD-10-CM

## 2020-04-09 PROCEDURE — 99442 PHONE E/M BY PHYS 11-20 MIN: CPT | Performed by: FAMILY MEDICINE

## 2020-04-09 NOTE — TELEPHONE ENCOUNTER
katie has Supended  All tests are canceled what is she supposed to do fell off bed  And  Has a headache and earache she has been disoriented and fallen a couple of times.

## 2020-04-20 ENCOUNTER — MED REC SCAN ONLY (OUTPATIENT)
Dept: FAMILY MEDICINE CLINIC | Facility: CLINIC | Age: 60
End: 2020-04-20

## 2020-05-06 ENCOUNTER — MED REC SCAN ONLY (OUTPATIENT)
Dept: FAMILY MEDICINE CLINIC | Facility: CLINIC | Age: 60
End: 2020-05-06

## 2020-05-11 RX ORDER — CYCLOBENZAPRINE HCL 10 MG
TABLET ORAL
Qty: 60 TABLET | Refills: 0 | Status: SHIPPED | OUTPATIENT
Start: 2020-05-11 | End: 2020-06-07

## 2020-05-11 RX ORDER — MELOXICAM 15 MG/1
TABLET ORAL
Qty: 90 TABLET | Refills: 0 | Status: SHIPPED | OUTPATIENT
Start: 2020-05-11 | End: 2020-10-31

## 2020-05-11 NOTE — TELEPHONE ENCOUNTER
Routing to provider per protocol. Cyclobenzaprine last refilled on 4/8/20 for # 60 with 0 rf. Meloxicam last refilled on 12/2/19 for # 90 with 0 rf. Last labs 3/3/20. Last seen on 3/3/20. No future appointments. Thank you.

## 2020-05-12 ENCOUNTER — TELEPHONE (OUTPATIENT)
Dept: SURGERY | Facility: CLINIC | Age: 60
End: 2020-05-12

## 2020-05-12 DIAGNOSIS — K43.9 VENTRAL HERNIA WITHOUT OBSTRUCTION OR GANGRENE: Primary | ICD-10-CM

## 2020-05-29 ENCOUNTER — TELEPHONE (OUTPATIENT)
Dept: FAMILY MEDICINE CLINIC | Facility: CLINIC | Age: 60
End: 2020-05-29

## 2020-06-07 RX ORDER — VENLAFAXINE HYDROCHLORIDE 150 MG/1
CAPSULE, EXTENDED RELEASE ORAL
Qty: 90 CAPSULE | Refills: 0 | Status: SHIPPED | OUTPATIENT
Start: 2020-06-07 | End: 2020-09-05

## 2020-06-07 RX ORDER — CYCLOBENZAPRINE HCL 10 MG
TABLET ORAL
Qty: 60 TABLET | Refills: 0 | Status: SHIPPED | OUTPATIENT
Start: 2020-06-07 | End: 2020-07-07

## 2020-06-07 RX ORDER — TRAMADOL HYDROCHLORIDE 50 MG/1
TABLET ORAL
Qty: 60 TABLET | Refills: 0 | Status: ON HOLD | OUTPATIENT
Start: 2020-06-07 | End: 2020-06-29

## 2020-06-07 RX ORDER — PROPRANOLOL HYDROCHLORIDE 60 MG/1
TABLET ORAL
Qty: 180 TABLET | Refills: 0 | Status: SHIPPED | OUTPATIENT
Start: 2020-06-07 | End: 2020-09-08

## 2020-06-08 RX ORDER — VENLAFAXINE HYDROCHLORIDE 150 MG/1
CAPSULE, EXTENDED RELEASE ORAL
Qty: 90 CAPSULE | Refills: 0 | OUTPATIENT
Start: 2020-06-08

## 2020-06-10 ENCOUNTER — TELEPHONE (OUTPATIENT)
Dept: SURGERY | Facility: CLINIC | Age: 60
End: 2020-06-10

## 2020-06-10 DIAGNOSIS — K43.9 VENTRAL HERNIA WITHOUT OBSTRUCTION OR GANGRENE: Primary | ICD-10-CM

## 2020-06-24 ENCOUNTER — MOBILE ENCOUNTER (OUTPATIENT)
Dept: FAMILY MEDICINE CLINIC | Facility: CLINIC | Age: 60
End: 2020-06-24

## 2020-06-24 RX ORDER — AZITHROMYCIN 250 MG/1
TABLET, FILM COATED ORAL
Qty: 6 TABLET | Refills: 0 | Status: SHIPPED | OUTPATIENT
Start: 2020-06-24

## 2020-06-26 ENCOUNTER — MED REC SCAN ONLY (OUTPATIENT)
Dept: FAMILY MEDICINE CLINIC | Facility: CLINIC | Age: 60
End: 2020-06-26

## 2020-06-27 ENCOUNTER — LAB ENCOUNTER (OUTPATIENT)
Dept: LAB | Facility: HOSPITAL | Age: 60
End: 2020-06-27
Attending: SURGERY
Payer: MEDICARE

## 2020-06-27 DIAGNOSIS — K43.9 VENTRAL HERNIA WITHOUT OBSTRUCTION OR GANGRENE: ICD-10-CM

## 2020-06-29 ENCOUNTER — ANESTHESIA EVENT (OUTPATIENT)
Dept: SURGERY | Facility: HOSPITAL | Age: 60
End: 2020-06-29
Payer: MEDICARE

## 2020-06-29 ENCOUNTER — HOSPITAL ENCOUNTER (OUTPATIENT)
Facility: HOSPITAL | Age: 60
Setting detail: HOSPITAL OUTPATIENT SURGERY
Discharge: HOME | End: 2020-06-29
Attending: SURGERY | Admitting: SURGERY
Payer: MEDICARE

## 2020-06-29 ENCOUNTER — ANESTHESIA (OUTPATIENT)
Dept: SURGERY | Facility: HOSPITAL | Age: 60
End: 2020-06-29
Payer: MEDICARE

## 2020-06-29 VITALS
SYSTOLIC BLOOD PRESSURE: 128 MMHG | WEIGHT: 275.56 LBS | BODY MASS INDEX: 43.25 KG/M2 | HEIGHT: 67 IN | HEART RATE: 90 BPM | TEMPERATURE: 98 F | OXYGEN SATURATION: 96 % | RESPIRATION RATE: 16 BRPM | DIASTOLIC BLOOD PRESSURE: 79 MMHG

## 2020-06-29 DIAGNOSIS — K43.9 VENTRAL HERNIA WITHOUT OBSTRUCTION OR GANGRENE: Primary | ICD-10-CM

## 2020-06-29 PROCEDURE — 76937 US GUIDE VASCULAR ACCESS: CPT | Performed by: SURGERY

## 2020-06-29 PROCEDURE — 36410 VNPNXR 3YR/> PHY/QHP DX/THER: CPT | Performed by: SURGERY

## 2020-06-29 PROCEDURE — 0WUF0JZ SUPPLEMENT ABDOMINAL WALL WITH SYNTHETIC SUBSTITUTE, OPEN APPROACH: ICD-10-PCS | Performed by: SURGERY

## 2020-06-29 DEVICE — VENTRALEX ST HERNIA PATCH
Type: IMPLANTABLE DEVICE | Site: ABDOMEN | Status: FUNCTIONAL
Brand: VENTRALEX ST HERNIA PATCH

## 2020-06-29 RX ORDER — METOCLOPRAMIDE HYDROCHLORIDE 5 MG/ML
10 INJECTION INTRAMUSCULAR; INTRAVENOUS AS NEEDED
Status: DISCONTINUED | OUTPATIENT
Start: 2020-06-29 | End: 2020-06-29

## 2020-06-29 RX ORDER — SODIUM CHLORIDE, SODIUM LACTATE, POTASSIUM CHLORIDE, CALCIUM CHLORIDE 600; 310; 30; 20 MG/100ML; MG/100ML; MG/100ML; MG/100ML
INJECTION, SOLUTION INTRAVENOUS CONTINUOUS
Status: DISCONTINUED | OUTPATIENT
Start: 2020-06-29 | End: 2020-06-29

## 2020-06-29 RX ORDER — LABETALOL HYDROCHLORIDE 5 MG/ML
5 INJECTION, SOLUTION INTRAVENOUS EVERY 5 MIN PRN
Status: DISCONTINUED | OUTPATIENT
Start: 2020-06-29 | End: 2020-06-29

## 2020-06-29 RX ORDER — LIDOCAINE HYDROCHLORIDE 10 MG/ML
INJECTION, SOLUTION EPIDURAL; INFILTRATION; INTRACAUDAL; PERINEURAL AS NEEDED
Status: DISCONTINUED | OUTPATIENT
Start: 2020-06-29 | End: 2020-06-29 | Stop reason: SURG

## 2020-06-29 RX ORDER — ACETAMINOPHEN 500 MG
1000 TABLET ORAL ONCE
Status: ON HOLD | COMMUNITY
End: 2020-06-29

## 2020-06-29 RX ORDER — TRAMADOL HYDROCHLORIDE 50 MG/1
50 TABLET ORAL EVERY 6 HOURS PRN
Qty: 20 TABLET | Refills: 0 | Status: SHIPPED | OUTPATIENT
Start: 2020-06-29 | End: 2020-10-08

## 2020-06-29 RX ORDER — HYDROCODONE BITARTRATE AND ACETAMINOPHEN 5; 325 MG/1; MG/1
2 TABLET ORAL AS NEEDED
Status: DISCONTINUED | OUTPATIENT
Start: 2020-06-29 | End: 2020-06-29

## 2020-06-29 RX ORDER — HEPARIN SODIUM 5000 [USP'U]/ML
5000 INJECTION, SOLUTION INTRAVENOUS; SUBCUTANEOUS ONCE
Status: COMPLETED | OUTPATIENT
Start: 2020-06-29 | End: 2020-06-29

## 2020-06-29 RX ORDER — HYDROCODONE BITARTRATE AND ACETAMINOPHEN 5; 325 MG/1; MG/1
1 TABLET ORAL AS NEEDED
Status: DISCONTINUED | OUTPATIENT
Start: 2020-06-29 | End: 2020-06-29

## 2020-06-29 RX ORDER — DEXAMETHASONE SODIUM PHOSPHATE 4 MG/ML
VIAL (ML) INJECTION AS NEEDED
Status: DISCONTINUED | OUTPATIENT
Start: 2020-06-29 | End: 2020-06-29 | Stop reason: SURG

## 2020-06-29 RX ORDER — HYDROMORPHONE HYDROCHLORIDE 1 MG/ML
INJECTION, SOLUTION INTRAMUSCULAR; INTRAVENOUS; SUBCUTANEOUS
Status: DISCONTINUED
Start: 2020-06-29 | End: 2020-06-29 | Stop reason: WASHOUT

## 2020-06-29 RX ORDER — LIDOCAINE HYDROCHLORIDE 40 MG/ML
INJECTION, SOLUTION RETROBULBAR; TOPICAL AS NEEDED
Status: DISCONTINUED | OUTPATIENT
Start: 2020-06-29 | End: 2020-06-29 | Stop reason: SURG

## 2020-06-29 RX ORDER — KETOROLAC TROMETHAMINE 30 MG/ML
INJECTION, SOLUTION INTRAMUSCULAR; INTRAVENOUS AS NEEDED
Status: DISCONTINUED | OUTPATIENT
Start: 2020-06-29 | End: 2020-06-29 | Stop reason: SURG

## 2020-06-29 RX ORDER — ONDANSETRON 2 MG/ML
4 INJECTION INTRAMUSCULAR; INTRAVENOUS AS NEEDED
Status: DISCONTINUED | OUTPATIENT
Start: 2020-06-29 | End: 2020-06-29

## 2020-06-29 RX ORDER — ACETAMINOPHEN 500 MG
1000 TABLET ORAL ONCE
Status: DISCONTINUED | OUTPATIENT
Start: 2020-06-29 | End: 2020-06-29

## 2020-06-29 RX ORDER — DIPHENHYDRAMINE HYDROCHLORIDE 50 MG/ML
12.5 INJECTION INTRAMUSCULAR; INTRAVENOUS AS NEEDED
Status: DISCONTINUED | OUTPATIENT
Start: 2020-06-29 | End: 2020-06-29

## 2020-06-29 RX ORDER — ROCURONIUM BROMIDE 10 MG/ML
INJECTION, SOLUTION INTRAVENOUS AS NEEDED
Status: DISCONTINUED | OUTPATIENT
Start: 2020-06-29 | End: 2020-06-29 | Stop reason: SURG

## 2020-06-29 RX ORDER — BUPIVACAINE HYDROCHLORIDE AND EPINEPHRINE 5; 5 MG/ML; UG/ML
INJECTION, SOLUTION EPIDURAL; INTRACAUDAL; PERINEURAL AS NEEDED
Status: DISCONTINUED | OUTPATIENT
Start: 2020-06-29 | End: 2020-06-29

## 2020-06-29 RX ORDER — METOCLOPRAMIDE HYDROCHLORIDE 5 MG/ML
INJECTION INTRAMUSCULAR; INTRAVENOUS AS NEEDED
Status: DISCONTINUED | OUTPATIENT
Start: 2020-06-29 | End: 2020-06-29 | Stop reason: SURG

## 2020-06-29 RX ORDER — CLINDAMYCIN PHOSPHATE 900 MG/50ML
900 INJECTION INTRAVENOUS ONCE
Status: COMPLETED | OUTPATIENT
Start: 2020-06-29 | End: 2020-06-29

## 2020-06-29 RX ORDER — MEPERIDINE HYDROCHLORIDE 25 MG/ML
12.5 INJECTION INTRAMUSCULAR; INTRAVENOUS; SUBCUTANEOUS AS NEEDED
Status: DISCONTINUED | OUTPATIENT
Start: 2020-06-29 | End: 2020-06-29

## 2020-06-29 RX ORDER — NALOXONE HYDROCHLORIDE 0.4 MG/ML
80 INJECTION, SOLUTION INTRAMUSCULAR; INTRAVENOUS; SUBCUTANEOUS AS NEEDED
Status: DISCONTINUED | OUTPATIENT
Start: 2020-06-29 | End: 2020-06-29

## 2020-06-29 RX ADMIN — LIDOCAINE HYDROCHLORIDE 2 ML: 40 INJECTION, SOLUTION RETROBULBAR; TOPICAL at 16:06:00

## 2020-06-29 RX ADMIN — ROCURONIUM BROMIDE 10 MG: 10 INJECTION, SOLUTION INTRAVENOUS at 16:06:00

## 2020-06-29 RX ADMIN — CLINDAMYCIN PHOSPHATE 900 MG: 900 INJECTION INTRAVENOUS at 16:07:00

## 2020-06-29 RX ADMIN — DEXAMETHASONE SODIUM PHOSPHATE 4 MG: 4 MG/ML VIAL (ML) INJECTION at 16:11:00

## 2020-06-29 RX ADMIN — LIDOCAINE HYDROCHLORIDE 50 MG: 10 INJECTION, SOLUTION EPIDURAL; INFILTRATION; INTRACAUDAL; PERINEURAL at 16:06:00

## 2020-06-29 RX ADMIN — KETOROLAC TROMETHAMINE 30 MG: 30 INJECTION, SOLUTION INTRAMUSCULAR; INTRAVENOUS at 16:39:00

## 2020-06-29 RX ADMIN — METOCLOPRAMIDE HYDROCHLORIDE 20 MG: 5 INJECTION INTRAMUSCULAR; INTRAVENOUS at 16:03:00

## 2020-06-29 NOTE — ANESTHESIA PROCEDURE NOTES
Airway  Date/Time: 6/29/2020 4:06 PM  Urgency: elective      General Information and Staff    Patient location during procedure: OR  Anesthesiologist: Marcellus Freeman MD    Indications and Patient Condition  Indications for airway management: anesthesia

## 2020-06-29 NOTE — ANESTHESIA PREPROCEDURE EVALUATION
PRE-OP EVALUATION    Patient Name: Madelyn Stone    Pre-op Diagnosis: Ventral hernia without obstruction or gangrene [K43.9]    Procedure(s):  VENTRAL HERNIA REPAIR WITH MESH     Surgeon(s) and Role:     Gabo Francis, DO - Primary    Pre-op reviewed.     Anesthetic Complications  (+) history of anesthetic complications  History of: PONV       GI/Hepatic/Renal  Comment: Diagnosis: Ventral hernia without obstruction or gangrene (K43.9)  Pre-op diagnosis: Ventral hernia without obstruction or stacy ASA: 2   Plan: general  NPO status verified and patient meets guidelines. Patient has taken beta blockers in last 24 hours.         Plan/risks discussed with: patient              We discussed GA w/ETT and possible scratchy throat and rarely dental d

## 2020-06-29 NOTE — ANESTHESIA POSTPROCEDURE EVALUATION
[address] [facility] Patient Status:  Hospital Outpatient Surgery   Age/Gender 64year old female MRN [id number]   [facility] SURGERY Attending Tai Quinones, [address] [facility] Day # 0 PCP America Jones MD       Anesthesia Pos

## 2020-06-29 NOTE — H&P
Patient presents with complaints of abdominal wall hernia. Patient states she has had 2 prior surgeries. The first was a gastric bypass performed through an upper midline incision.   The second was a hernia repair and cholecystectomy also performed throug NEEDED, Disp: 60 tablet, Rfl: 0  LEVOTHYROXINE SODIUM 75 MCG Oral Tab, TAKE 1 TABLET BY MOUTH EVERY MORNING BEFORE BREAKFAST, Disp: 90 tablet, Rfl: 1  MELOXICAM 15 MG Oral Tab, TAKE 1 TABLET BY MOUTH EVERY DAY AS NEEDED, Disp: 90 tablet, Rfl: 0  PROPRANOLO 69, temperature 97.8 °F (36.6 °C), temperature source Temporal, height 67\", weight 286 lb (129.7 kg). GENERAL: well developed, well nourished female, in no apparent distress.     MENTAL STATUS :Alert, oriented x 3  PSYCH: normal mood and affect  SKIN: ani 4.4 g/dL Final   • A/G Ratio 03/03/2020 0.9* 1.0 - 2.0 Final   • FASTING 03/03/2020 No    Final   • HgbA1C 03/03/2020 6.1* <5.7 % Final      Normal HbA1C:     <5.7%                                        Pre-Diabetic:     5.7 - 6.4% Deficiency   20-<30 ng/mL      Insufficiency    ng/mL      Sufficiency   >100   ng/mL      Toxicity     *Clinical controversy exists regarding optimal 25(OH)D levels.  Emerging evidence links potential adverse effects to high levels, particularly >60 ventral hernia incarcerated  PLan: Open ventral herniorrhaphy with mesh discussed with patient risks of surgery to include bleeding infection pneumonia DVT and recurrence of the hernia.

## 2020-06-30 ENCOUNTER — MED REC SCAN ONLY (OUTPATIENT)
Dept: SURGERY | Facility: CLINIC | Age: 60
End: 2020-06-30

## 2020-06-30 NOTE — OPERATIVE REPORT
[address]    PATIENT'S NAME: Rosendo KWON   ATTENDING PHYSICIAN: Zeina Villa D.O.   OPERATING PHYSICIAN: Zeina Villa D.O.   PATIENT ACCOUNT#:   [de-identified]    LOCATION:  [address] [place] [address] [address] #:   [id number] suture. Copious irrigation was then carried out within the dissection area. All fluid was suctioned clear. The subcutaneous tissues approximated using interrupted 3-0 Vicryl suture. Skin edges approximated using sterile stapling device.   Overlying st

## 2020-07-07 RX ORDER — CYCLOBENZAPRINE HCL 10 MG
TABLET ORAL
Qty: 60 TABLET | Refills: 0 | Status: SHIPPED | OUTPATIENT
Start: 2020-07-07 | End: 2020-09-08

## 2020-07-10 ENCOUNTER — NURSE ONLY (OUTPATIENT)
Dept: FAMILY MEDICINE CLINIC | Facility: CLINIC | Age: 60
End: 2020-07-10
Payer: MEDICARE

## 2020-07-10 DIAGNOSIS — E03.9 HYPOTHYROIDISM, UNSPECIFIED TYPE: ICD-10-CM

## 2020-07-10 LAB — TSI SER-ACNC: 0.98 MIU/ML (ref 0.36–3.74)

## 2020-07-10 PROCEDURE — 84443 ASSAY THYROID STIM HORMONE: CPT | Performed by: FAMILY MEDICINE

## 2020-07-10 NOTE — PROGRESS NOTES
Patient presented for lab work ordered by Lorrin Apley. One mint tube drawn with a butterfly needle in left AC space. Pt tolerated procedure well.

## 2020-07-13 ENCOUNTER — TELEPHONE (OUTPATIENT)
Dept: SURGERY | Facility: CLINIC | Age: 60
End: 2020-07-13

## 2020-07-13 DIAGNOSIS — Z20.822 EXPOSURE TO COVID-19 VIRUS: Primary | ICD-10-CM

## 2020-07-13 NOTE — TELEPHONE ENCOUNTER
Patient called, s/p ventral hernia repair on 6/29 with Dr Sai Rivera. Patient states that her and her granddaughter went out to eat at [employer] over the weekend and neither one wore a mask.  Patients granddaughter is now not feeling well, patient is not showing a

## 2020-07-14 ENCOUNTER — LAB ENCOUNTER (OUTPATIENT)
Dept: LAB | Facility: HOSPITAL | Age: 60
End: 2020-07-14
Attending: COLON & RECTAL SURGERY
Payer: MEDICARE

## 2020-07-14 DIAGNOSIS — Z20.822 EXPOSURE TO COVID-19 VIRUS: ICD-10-CM

## 2020-07-15 LAB — SARS-COV-2 RNA RESP QL NAA+PROBE: NOT DETECTED

## 2020-07-16 NOTE — TELEPHONE ENCOUNTER
To better clarify, pt is taking tramadol while out of Cyclobenzaprine. She does need a refill, please see telephone encounter 3/2/19. Also, I had trouble placing TSH with reflex for this pt. Please see and sign pended orders. room air

## 2020-07-31 ENCOUNTER — MED REC SCAN ONLY (OUTPATIENT)
Dept: FAMILY MEDICINE CLINIC | Facility: CLINIC | Age: 60
End: 2020-07-31

## 2020-08-06 DIAGNOSIS — G25.81 RESTLESS LEGS SYNDROME (RLS): ICD-10-CM

## 2020-08-06 DIAGNOSIS — G25.81 RESTLESS LEGS SYNDROME (RLS): Primary | ICD-10-CM

## 2020-08-06 RX ORDER — ROPINIROLE 2 MG/1
TABLET, FILM COATED ORAL
Qty: 90 TABLET | Refills: 0 | OUTPATIENT
Start: 2020-08-06

## 2020-08-06 RX ORDER — ROPINIROLE 2 MG/1
TABLET, FILM COATED ORAL
Qty: 30 TABLET | Refills: 1 | Status: SHIPPED | OUTPATIENT
Start: 2020-08-06 | End: 2020-10-07

## 2020-08-06 NOTE — TELEPHONE ENCOUNTER
Sent the patient a [employer] message asking to make an appt for further medication refills.        Medication: ROPINIROLE HCL 2 MG Oral Tab    Date of last refill: 02/19/2020 (#30/5)  [place]    Last office visit: 2/19/

## 2020-08-06 NOTE — TELEPHONE ENCOUNTER
Per Epic review, received #30 day of medication, #90 day not appropriate at this time as she needs appt. Refused this request at this time.

## 2020-08-11 DIAGNOSIS — M79.7 FIBROMYALGIA: ICD-10-CM

## 2020-08-12 RX ORDER — GABAPENTIN 400 MG/1
CAPSULE ORAL
Qty: 120 CAPSULE | Refills: 0 | Status: SHIPPED | OUTPATIENT
Start: 2020-08-12 | End: 2020-09-16

## 2020-08-12 NOTE — TELEPHONE ENCOUNTER
LMTCB and schedule nisreen for further medication refills.      Medication: GABAPENTIN 400 MG Oral Cap    Date of last refill: 02/19/2020 (#120/5)  [place]    Last office visit: 2/19/2020  Due back to clinic per last offi

## 2020-09-05 DIAGNOSIS — M79.7 FIBROMYALGIA: ICD-10-CM

## 2020-09-05 RX ORDER — VENLAFAXINE HYDROCHLORIDE 150 MG/1
CAPSULE, EXTENDED RELEASE ORAL
Qty: 90 CAPSULE | Refills: 1 | Status: SHIPPED | OUTPATIENT
Start: 2020-09-05 | End: 2021-04-28

## 2020-09-05 NOTE — TELEPHONE ENCOUNTER
No refill protocol for this medication. Last refill: 6/07/2020 #90 with 0 refills  Last Visit: 3/03/2020  Next Visit: No future appointments. Forward to Dr. Romano Moh please advise on refills. Thanks.

## 2020-09-07 ENCOUNTER — NURSE ONLY (OUTPATIENT)
Dept: FAMILY MEDICINE CLINIC | Facility: CLINIC | Age: 60
End: 2020-09-07
Payer: MEDICARE

## 2020-09-07 VITALS — TEMPERATURE: 97 F

## 2020-09-07 DIAGNOSIS — Z23 NEED FOR INFLUENZA VACCINATION: Primary | ICD-10-CM

## 2020-09-07 PROCEDURE — G0008 ADMIN INFLUENZA VIRUS VAC: HCPCS | Performed by: NURSE PRACTITIONER

## 2020-09-07 PROCEDURE — 90686 IIV4 VACC NO PRSV 0.5 ML IM: CPT | Performed by: NURSE PRACTITIONER

## 2020-09-08 RX ORDER — CYCLOBENZAPRINE HCL 10 MG
TABLET ORAL
Qty: 60 TABLET | Refills: 0 | Status: SHIPPED | OUTPATIENT
Start: 2020-09-08 | End: 2020-11-30

## 2020-09-08 RX ORDER — PROPRANOLOL HYDROCHLORIDE 60 MG/1
TABLET ORAL
Qty: 180 TABLET | Refills: 0 | Status: SHIPPED | OUTPATIENT
Start: 2020-09-08 | End: 2021-01-04

## 2020-09-08 NOTE — TELEPHONE ENCOUNTER
Pt due for f/u appt.  LMTCB    Medication: GABAPENTIN 400 MG     Date of last refill: 8/12/20 (#120/0)  [place]    Last office visit: 2/19/20  Due back to clinic per last office note:  6 months  Date next office visit

## 2020-09-08 NOTE — TELEPHONE ENCOUNTER
Cyclobenzaprine protocol: none  Last refilled 7/7/20 #60 with 0 RF    Propanolol failed protocol due to the following reason:  Last refilled 6/7/20 #180 with 0 RF  LOV with [facility] 3/3/20  No future appt  Routed to PCP to advise     BP Readings from Last 3 [place]

## 2020-09-11 ENCOUNTER — MED REC SCAN ONLY (OUTPATIENT)
Dept: FAMILY MEDICINE CLINIC | Facility: CLINIC | Age: 60
End: 2020-09-11

## 2020-09-16 RX ORDER — GABAPENTIN 400 MG/1
CAPSULE ORAL
Qty: 132 CAPSULE | Refills: 0 | Status: SHIPPED | OUTPATIENT
Start: 2020-09-16 | End: 2020-11-30

## 2020-10-05 ENCOUNTER — TELEPHONE (OUTPATIENT)
Dept: FAMILY MEDICINE CLINIC | Facility: CLINIC | Age: 60
End: 2020-10-05

## 2020-10-05 RX ORDER — LEVOTHYROXINE SODIUM 0.07 MG/1
TABLET ORAL
Qty: 90 TABLET | Refills: 0 | Status: SHIPPED | OUTPATIENT
Start: 2020-10-05 | End: 2021-03-16

## 2020-10-05 NOTE — TELEPHONE ENCOUNTER
Thyroid Supplements Protocol [id number]/05/2020 09:13 AM   TSH test in past 12 months Protocol Details    TSH value between 0.350 and 5.500 IU/ml     Appointment in past 12 or next 3 months      Refilled per protocol.

## 2020-10-05 NOTE — TELEPHONE ENCOUNTER
Spoke with [place] at [facility] who states patient has test scheduled and last order they have is from Feb 2019. Did not received order from march 2020    Per [facility], no changes in plan, needs echo. Order faxed.

## 2020-10-05 NOTE — TELEPHONE ENCOUNTER
Kyla Hammans from [facility] scheduling is calling to get the new order for the Echo Sent to Fax Number [phone]  Pt is scheduled to have test there soon.

## 2020-10-07 DIAGNOSIS — G25.81 RESTLESS LEGS SYNDROME (RLS): ICD-10-CM

## 2020-10-07 RX ORDER — ROPINIROLE 2 MG/1
2 TABLET, FILM COATED ORAL NIGHTLY
Qty: 90 TABLET | Refills: 0 | Status: SHIPPED | OUTPATIENT
Start: 2020-10-07 | End: 2020-12-01

## 2020-10-07 NOTE — TELEPHONE ENCOUNTER
Medication: Ropinerole    Date of last refill: 8/6/20 (#30/1)  [place]     Last office visit: 2/19/20  Due back to clinic per last office note:  3-6 months  Date next office visit scheduled:    Future Appointments   Date

## 2020-10-07 NOTE — TELEPHONE ENCOUNTER
Protocol: none  Last refilled 6/29/20 #20 with 0 RF  LOV with [address] 3/3/20  Future appt with [address] 10/27/20  Routed to PCP to advise

## 2020-10-08 RX ORDER — TRAMADOL HYDROCHLORIDE 50 MG/1
TABLET ORAL
Qty: 60 TABLET | Refills: 0 | Status: SHIPPED | OUTPATIENT
Start: 2020-10-08 | End: 2021-06-27

## 2020-10-28 ENCOUNTER — TELEPHONE (OUTPATIENT)
Dept: PHYSICAL THERAPY | Age: 60
End: 2020-10-28

## 2020-10-29 ENCOUNTER — APPOINTMENT (OUTPATIENT)
Dept: OCCUPATIONAL MEDICINE | Age: 60
End: 2020-10-29
Payer: MEDICARE

## 2020-10-30 ENCOUNTER — APPOINTMENT (OUTPATIENT)
Dept: OCCUPATIONAL MEDICINE | Age: 60
End: 2020-10-30
Attending: Other
Payer: MEDICARE

## 2020-10-30 ENCOUNTER — TELEPHONE (OUTPATIENT)
Dept: PHYSICAL THERAPY | Age: 60
End: 2020-10-30

## 2020-10-31 RX ORDER — MELOXICAM 15 MG/1
TABLET ORAL
Qty: 90 TABLET | Refills: 0 | Status: SHIPPED | OUTPATIENT
Start: 2020-10-31 | End: 2021-03-08

## 2020-10-31 NOTE — TELEPHONE ENCOUNTER
LOV: 3/3/20   Last Refill: 5/11/20 #90 0 RF    Future Appointments   Date Time Provider [facility] Lisa   11/11/2020  1:20 PM MD KAREN Chery [facility] [place]   11/13/2020  2:00 PM Chela Knox OT PF O[facility]   11/20/2020  2:

## 2020-11-03 ENCOUNTER — TELEPHONE (OUTPATIENT)
Dept: PHYSICAL THERAPY | Age: 60
End: 2020-11-03

## 2020-11-03 ENCOUNTER — APPOINTMENT (OUTPATIENT)
Dept: OCCUPATIONAL MEDICINE | Age: 60
End: 2020-11-03
Attending: FAMILY MEDICINE
Payer: MEDICARE

## 2020-11-13 ENCOUNTER — APPOINTMENT (OUTPATIENT)
Dept: OCCUPATIONAL MEDICINE | Age: 60
End: 2020-11-13
Payer: MEDICARE

## 2020-11-20 ENCOUNTER — APPOINTMENT (OUTPATIENT)
Dept: OCCUPATIONAL MEDICINE | Age: 60
End: 2020-11-20
Payer: MEDICARE

## 2020-11-23 ENCOUNTER — APPOINTMENT (OUTPATIENT)
Dept: OCCUPATIONAL MEDICINE | Age: 60
End: 2020-11-23
Payer: MEDICARE

## 2020-11-24 ENCOUNTER — APPOINTMENT (OUTPATIENT)
Dept: OCCUPATIONAL MEDICINE | Age: 60
End: 2020-11-24
Payer: MEDICARE

## 2020-11-29 DIAGNOSIS — M79.7 FIBROMYALGIA: ICD-10-CM

## 2020-11-30 ENCOUNTER — APPOINTMENT (OUTPATIENT)
Dept: OCCUPATIONAL MEDICINE | Age: 60
End: 2020-11-30
Payer: MEDICARE

## 2020-11-30 DIAGNOSIS — G25.81 RESTLESS LEGS SYNDROME (RLS): ICD-10-CM

## 2020-11-30 RX ORDER — CYCLOBENZAPRINE HCL 10 MG
TABLET ORAL
Qty: 60 TABLET | Refills: 0 | Status: SHIPPED | OUTPATIENT
Start: 2020-11-30 | End: 2021-01-09

## 2020-11-30 NOTE — TELEPHONE ENCOUNTER
Routing to provider per protocol. Last refilled on 9/8/20 for # 60 with 0 rf. Last seen on 3/3/20. No future appointments. Thank you.

## 2020-11-30 NOTE — TELEPHONE ENCOUNTER
Left voicemail for patient to call back to schedule appt regarding refill request.  Medication: GABAPENTIN 400 MG Oral Cap    Date of last refill: 9/16/20 (#132/0)  [place]    Last office visit: 2/19/20  Due back to

## 2020-12-01 RX ORDER — ROPINIROLE 2 MG/1
TABLET, FILM COATED ORAL
Qty: 90 TABLET | Refills: 0 | Status: SHIPPED | OUTPATIENT
Start: 2020-12-01 | End: 2021-01-06

## 2020-12-01 RX ORDER — GABAPENTIN 400 MG/1
CAPSULE ORAL
Qty: 60 CAPSULE | Refills: 0 | Status: SHIPPED | OUTPATIENT
Start: 2020-12-01 | End: 2021-01-29

## 2020-12-01 NOTE — TELEPHONE ENCOUNTER
LMTCB and schedule an appt for further medication refills. Left office hours and phone number.        Medication: ROPINIROLE HCL 2 MG Oral Tab    Date of last refill: 10/07/2020 (#90/0)  [place]    Last office visit:

## 2020-12-03 ENCOUNTER — APPOINTMENT (OUTPATIENT)
Dept: OCCUPATIONAL MEDICINE | Age: 60
End: 2020-12-03
Payer: MEDICARE

## 2020-12-07 ENCOUNTER — APPOINTMENT (OUTPATIENT)
Dept: OCCUPATIONAL MEDICINE | Age: 60
End: 2020-12-07
Payer: MEDICARE

## 2020-12-08 ENCOUNTER — TELEPHONE (OUTPATIENT)
Dept: NEUROLOGY | Facility: CLINIC | Age: 60
End: 2020-12-08

## 2020-12-10 ENCOUNTER — APPOINTMENT (OUTPATIENT)
Dept: OCCUPATIONAL MEDICINE | Age: 60
End: 2020-12-10
Payer: MEDICARE

## 2021-01-04 RX ORDER — PROPRANOLOL HYDROCHLORIDE 60 MG/1
60 TABLET ORAL 2 TIMES DAILY
Qty: 180 TABLET | Refills: 0 | Status: SHIPPED | OUTPATIENT
Start: 2021-01-04 | End: 2021-03-08

## 2021-01-04 NOTE — TELEPHONE ENCOUNTER
Hypertension Medications Protocol [id number]/04/2021 12:30 PM   Appointment in past 6 or next 3 months Protocol Details    CMP or BMP in past 12 months     Last serum creatinine< 2.0

## 2021-01-04 NOTE — TELEPHONE ENCOUNTER
Last refilled on 9/8/20 for # 180 with 0 refills  Last labs CMP 3/3/20  Last OV 3/3/20  Future Appointments   Date Time Provider Mago Gonzalez   1/6/2021  2:40 PM Taylor Avila MD ENINAPER [facility] [place]        Thank you.

## 2021-01-06 ENCOUNTER — VIRTUAL PHONE E/M (OUTPATIENT)
Dept: NEUROLOGY | Facility: CLINIC | Age: 61
End: 2021-01-06
Payer: MEDICARE

## 2021-01-06 DIAGNOSIS — G43.009 MIGRAINE WITHOUT AURA AND WITHOUT STATUS MIGRAINOSUS, NOT INTRACTABLE: ICD-10-CM

## 2021-01-06 DIAGNOSIS — G25.81 RESTLESS LEGS SYNDROME (RLS): ICD-10-CM

## 2021-01-06 DIAGNOSIS — G60.8 PERIPHERAL SENSORY NEUROPATHY: ICD-10-CM

## 2021-01-06 PROCEDURE — 99441 PHONE E/M BY PHYS 5-10 MIN: CPT | Performed by: OTHER

## 2021-01-06 RX ORDER — ROPINIROLE 4 MG/1
4 TABLET, FILM COATED ORAL NIGHTLY
Qty: 30 TABLET | Refills: 2 | Status: SHIPPED | OUTPATIENT
Start: 2021-01-06 | End: 2021-03-08

## 2021-01-06 NOTE — PROGRESS NOTES
HPI:    Patient ID: Trixie Schultz is a 64year old female. Virtual Telephone Check-In    Trixie Schultz verbally consents a Virtual/Telephone Check-In visit on 01/06/21.   Patient has been referred to the [facility] website at [address] [facility] gastric bypass (lost 200#)   • OTHER SURGICAL HISTORY  2008    L knee surgery   • OTHER SURGICAL HISTORY  2010    L carpal tunnel    • WRIST FRACTURE SURGERY        Family History   Problem Relation Age of Onset   • Breast Cancer Mother         unsure if a Capsule SR 24 Hr TAKE 1 CAPSULE BY MOUTH EVERY DAY 90 capsule 1   • azithromycin (ZITHROMAX Z-NELLI) 250 MG Oral Tab 2 po qd x 1 d; then 1 po qd x 4 days 6 tablet 0   • TRIAMCINOLONE ACETONIDE 0.1 % External Cream APPLY TO THE AFFECTED AREA TWICE DAILY AS NE

## 2021-01-08 ENCOUNTER — PATIENT OUTREACH (OUTPATIENT)
Dept: FAMILY MEDICINE CLINIC | Facility: CLINIC | Age: 61
End: 2021-01-08

## 2021-01-09 NOTE — TELEPHONE ENCOUNTER
Received faxed request from [facility] for Cyclobenzaprine.    Last refill - 11/30/21 - #60  Last office visit - 3/3/20

## 2021-01-10 RX ORDER — CYCLOBENZAPRINE HCL 10 MG
10 TABLET ORAL 2 TIMES DAILY PRN
Qty: 60 TABLET | Refills: 0 | Status: SHIPPED | OUTPATIENT
Start: 2021-01-10 | End: 2021-02-01

## 2021-01-28 DIAGNOSIS — M79.7 FIBROMYALGIA: ICD-10-CM

## 2021-01-29 RX ORDER — GABAPENTIN 400 MG/1
CAPSULE ORAL
Qty: 120 CAPSULE | Refills: 2 | Status: SHIPPED | OUTPATIENT
Start: 2021-01-29 | End: 2021-07-02

## 2021-01-29 NOTE — TELEPHONE ENCOUNTER
Medication: GABAPENTIN 400 MG Oral Cap    Date of last refill: 12/01/2020 (#60/0)  [place]    Last office visit: 01/06/2021  Due back to clinic per last office note:  3-6 months  Date next office visit scheduled:

## 2021-02-01 RX ORDER — CYCLOBENZAPRINE HCL 10 MG
10 TABLET ORAL 2 TIMES DAILY PRN
Qty: 60 TABLET | Refills: 0 | Status: SHIPPED | OUTPATIENT
Start: 2021-02-01 | End: 2021-04-30

## 2021-02-08 ENCOUNTER — TELEPHONE (OUTPATIENT)
Dept: FAMILY MEDICINE CLINIC | Facility: CLINIC | Age: 61
End: 2021-02-08

## 2021-02-08 NOTE — TELEPHONE ENCOUNTER
She had a cap that fell off, couldn't get it fixed, has ended up with a jagged tooth. She bit her tongue, repeatedly. Area almost the size of a dime with a yellowish/greenish coating on it. It does not seem to be actually draining.  This has been going on s

## 2021-02-08 NOTE — TELEPHONE ENCOUNTER
IF she's in [place] I can't do a VV. I'd suggest she find a walk in clinic there. Most likely some listerine or magic mouthwash would be good for the tongue, but tough to know without seeing it.   Sounds like leg should proably be looked at too--skin redness a

## 2021-02-08 NOTE — TELEPHONE ENCOUNTER
Pt called, Bite her tongue a few days ago and thinks it is infected  Can Dr. Milagros Herrera call pt in an anitbiotic?   Please call pt at [phone]

## 2021-02-25 ENCOUNTER — TELEPHONE (OUTPATIENT)
Dept: FAMILY MEDICINE CLINIC | Facility: CLINIC | Age: 61
End: 2021-02-25

## 2021-02-25 NOTE — TELEPHONE ENCOUNTER
Fax refill request from pharmacy #[phone] for Omega ethyl esters    Left message for the pt asking her to confirm if she has requested this medication.

## 2021-03-02 NOTE — TELEPHONE ENCOUNTER
Spoke with the pt and she states that she did not order this medication- sending refusal to the pharmacy

## 2021-03-08 RX ORDER — PROPRANOLOL HYDROCHLORIDE 60 MG/1
60 TABLET ORAL 2 TIMES DAILY
Qty: 180 TABLET | Refills: 0 | Status: SHIPPED | OUTPATIENT
Start: 2021-03-08 | End: 2021-09-20

## 2021-03-08 RX ORDER — MELOXICAM 15 MG/1
15 TABLET ORAL
Qty: 90 TABLET | Refills: 0 | Status: SHIPPED | OUTPATIENT
Start: 2021-03-08 | End: 2021-06-28

## 2021-03-08 RX ORDER — ROPINIROLE 4 MG/1
TABLET, FILM COATED ORAL
Qty: 90 TABLET | Refills: 0 | Status: SHIPPED | OUTPATIENT
Start: 2021-03-08 | End: 2021-08-23

## 2021-03-08 NOTE — TELEPHONE ENCOUNTER
Medication: ROPINIROLE HCL 4 MG     Date of last refill: 1/6/21 (#30/2)  [place]     Last office visit: 1/6/2021  Due back to clinic per last office note:  3-6 months  Date next office visit scheduled:    Future Appointm

## 2021-03-08 NOTE — TELEPHONE ENCOUNTER
LOV 3/3/20    LAST LAB 7/10/20 thyroid, 3/3/20 routine labs    LAST RX meloxicam 10/31/20 #90/0rf  Propranolol 1/4/21 #180/0rf    Next OV 4/2/21    PROTOCOL

## 2021-03-09 ENCOUNTER — TELEPHONE (OUTPATIENT)
Dept: NEUROLOGY | Facility: CLINIC | Age: 61
End: 2021-03-09

## 2021-03-09 DIAGNOSIS — G60.8 PERIPHERAL SENSORY NEUROPATHY: Primary | ICD-10-CM

## 2021-03-16 RX ORDER — LEVOTHYROXINE SODIUM 0.07 MG/1
75 TABLET ORAL
Qty: 90 TABLET | Refills: 0 | Status: SHIPPED | OUTPATIENT
Start: 2021-03-16 | End: 2021-06-02

## 2021-03-16 NOTE — TELEPHONE ENCOUNTER
LOV 3/3/20    LAST LAB TSH 7/10/20 advised to recheck 6-12 months    LAST RX 10/5/20 #90/0rf    Next OV 4/2/21    PROTOCOL

## 2021-03-18 ENCOUNTER — TELEPHONE (OUTPATIENT)
Dept: FAMILY MEDICINE CLINIC | Facility: CLINIC | Age: 61
End: 2021-03-18

## 2021-03-18 RX ORDER — LEVOTHYROXINE SODIUM 0.07 MG/1
75 TABLET ORAL
Qty: 90 TABLET | Refills: 0 | OUTPATIENT
Start: 2021-03-18

## 2021-03-30 ENCOUNTER — TELEPHONE (OUTPATIENT)
Dept: FAMILY MEDICINE CLINIC | Facility: CLINIC | Age: 61
End: 2021-03-30

## 2021-03-30 NOTE — TELEPHONE ENCOUNTER
Pt states her insurance company told her they will only refill one more time; pt states insurance is telling her it's prescribed wrong; pls call her.

## 2021-03-30 NOTE — TELEPHONE ENCOUNTER
Spoke with patient who states the insurance company is questioning Gabapentin dosage and will only give her a 1 month supply. Spoke with Lazara Chowdhury at [facility] who states there is no problem with patient's Gabapentin prescription.  Patient was advised she

## 2021-03-30 NOTE — TELEPHONE ENCOUNTER
MLTCB on VM (ok per HIPAA consent) - per Epic review, Gabapentin 400mg has been approved through [id number]- quantity #4/day.

## 2021-04-02 RX ORDER — POLYMYXIN B SULFATE AND TRIMETHOPRIM 1; 10000 MG/ML; [USP'U]/ML
1-2 SOLUTION OPHTHALMIC EVERY 4 HOURS
Qty: 10 ML | Refills: 0 | Status: SHIPPED | OUTPATIENT
Start: 2021-04-02 | End: 2021-04-09

## 2021-04-02 NOTE — TELEPHONE ENCOUNTER
Called patient and patient states eyes are getting better. Suggested the eye drops and patient agrees to try eye drops.
Called patient and patient states that a few days ago woke up and left eye was crusted over. Patient states cleaned out eye and the next morning right eye was crusted over and red and worse than left eye.   Patient states that it feels like she has somethi
It sounds like the sore throat may be from the hot soup? If that makes sense to her then I'd just wait that out. But if she feels \"sick\" with the sore throat--achy, tired, etc, she should be seen, WIC or UC.   If thinks more the hot soup with throat the
LM to call back regarding sore throat and pink eye.
PT CALLED AND ADV THAT PT HAS PINK EYE AND WOULD LIKE TO SEE SOMETHING CAN BE CALLED IN, PT ADV THAT SHE CALLED OVER THE WEEKEND AND GOT A DR THAT SAID THAT SHE WAITED THIS LONG SHE CAN WAIT TILL Monday TO COME IN ON Monday.  (NOTHING IN CHART OF CALL)
Script sent
Splenic artery pseudoaneurysm

## 2021-04-28 ENCOUNTER — PATIENT OUTREACH (OUTPATIENT)
Dept: FAMILY MEDICINE CLINIC | Facility: CLINIC | Age: 61
End: 2021-04-28

## 2021-04-28 RX ORDER — VENLAFAXINE HYDROCHLORIDE 150 MG/1
150 CAPSULE, EXTENDED RELEASE ORAL DAILY
Qty: 90 CAPSULE | Refills: 0 | Status: SHIPPED | OUTPATIENT
Start: 2021-04-28 | End: 2021-08-20

## 2021-04-30 RX ORDER — CYCLOBENZAPRINE HCL 10 MG
10 TABLET ORAL 2 TIMES DAILY PRN
Qty: 60 TABLET | Refills: 0 | Status: SHIPPED | OUTPATIENT
Start: 2021-04-30 | End: 2021-06-27

## 2021-05-03 NOTE — TELEPHONE ENCOUNTER
Script printed [Mother] : mother [Cow's milk (Ounces per day ___)] : consumes [unfilled] oz of cow's milk per day [Normal] : Normal [Sippy cup use] : Sippy cup use [No] : No cigarette smoke exposure [Delayed] : de [FreeTextEntry1] : MASSIMO ZHANG is a 16 month here for well . Pt doing well no concerns. \par

## 2021-06-01 NOTE — TELEPHONE ENCOUNTER
Pt failed refill protocol for the following reasons:    Thyroid Supplements Protocol [id number]/29/2021 05:36 PM   Appointment in past 12 or next 3 months         Last refill: 3/16/2021 #90 with 0 refills  Last appt: 3/03/2020  Next appt: No future appointme

## 2021-06-02 RX ORDER — LEVOTHYROXINE SODIUM 0.07 MG/1
TABLET ORAL
Qty: 90 TABLET | Refills: 0 | Status: SHIPPED | OUTPATIENT
Start: 2021-06-02 | End: 2021-09-20

## 2021-06-02 NOTE — TELEPHONE ENCOUNTER
Spoke with patient made apt.    Future Appointments   Date Time Provider Mago Gonzalez   6/9/2021  3:30 PM Juli Nova MD [facility] Garr [place]heim

## 2021-06-16 ENCOUNTER — TELEPHONE (OUTPATIENT)
Dept: FAMILY MEDICINE CLINIC | Facility: CLINIC | Age: 61
End: 2021-06-16

## 2021-06-23 RX ORDER — TRAMADOL HYDROCHLORIDE 50 MG/1
TABLET ORAL
Qty: 60 TABLET | Refills: 0 | Status: CANCELLED | OUTPATIENT
Start: 2021-06-23

## 2021-06-23 NOTE — TELEPHONE ENCOUNTER
Last refilled on 10/08/2020 for # 60 with 0 rf. For tramodol hcl 50 mg. Last refills on 04/30/2021 for # 60 with 0 rf. For cyclobenzaprine 10 mg. Last refilled on 12/17/2015 for vitamin D with 0 rf. Last seen on 03/03/2020. Thank you.

## 2021-06-23 NOTE — TELEPHONE ENCOUNTER
Patient has been notified a few times this spring that she's overdue for OV and needs to schedule for me to refill meds.   Please schedule if she'd like me to continue to be her doc, thanks

## 2021-06-24 NOTE — TELEPHONE ENCOUNTER
Future Appointments   Date Time Provider Mago Gonzalez   7/2/2021  3:00 PM Gregg Pinto MD [facility] SAMANTHA Farmer

## 2021-06-27 RX ORDER — ACETAMINOPHEN 160 MG
TABLET,DISINTEGRATING ORAL
Qty: 90 CAPSULE | Refills: 3 | Status: SHIPPED | OUTPATIENT
Start: 2021-06-27

## 2021-06-27 RX ORDER — TRAMADOL HYDROCHLORIDE 50 MG/1
TABLET ORAL
Qty: 60 TABLET | Refills: 0 | Status: SHIPPED | OUTPATIENT
Start: 2021-06-27 | End: 2021-07-27

## 2021-06-27 RX ORDER — CYCLOBENZAPRINE HCL 10 MG
TABLET ORAL
Qty: 60 TABLET | Refills: 0 | Status: SHIPPED | OUTPATIENT
Start: 2021-06-27 | End: 2021-07-21

## 2021-06-28 RX ORDER — MELOXICAM 15 MG/1
TABLET ORAL
Qty: 90 TABLET | Refills: 0 | Status: SHIPPED | OUTPATIENT
Start: 2021-06-28 | End: 2021-09-20

## 2021-06-28 NOTE — TELEPHONE ENCOUNTER
LOV 03/03/2020    Last refill on 03/08/2021, for #90 tabs, with 0 refills  Meloxicam 15 MG Oral Tab    Future Appointments   Date Time Provider Mago Gonzalez   7/2/2021  3:00 PM Brayan Munoz MD [facility] Tali Beckett     Order(s) pending, please review.

## 2021-07-01 DIAGNOSIS — M79.7 FIBROMYALGIA: ICD-10-CM

## 2021-07-02 ENCOUNTER — TELEMEDICINE (OUTPATIENT)
Dept: FAMILY MEDICINE CLINIC | Facility: CLINIC | Age: 61
End: 2021-07-02
Payer: MEDICARE

## 2021-07-02 DIAGNOSIS — L03.039 CELLULITIS OF TOE, UNSPECIFIED LATERALITY: Primary | ICD-10-CM

## 2021-07-02 PROCEDURE — 99214 OFFICE O/P EST MOD 30 MIN: CPT | Performed by: FAMILY MEDICINE

## 2021-07-02 RX ORDER — SULFAMETHOXAZOLE AND TRIMETHOPRIM 800; 160 MG/1; MG/1
1 TABLET ORAL 2 TIMES DAILY
Qty: 20 TABLET | Refills: 0 | Status: SHIPPED | OUTPATIENT
Start: 2021-07-02 | End: 2021-07-12

## 2021-07-02 RX ORDER — GABAPENTIN 400 MG/1
CAPSULE ORAL
Qty: 120 CAPSULE | Refills: 2 | Status: SHIPPED | OUTPATIENT
Start: 2021-07-02

## 2021-07-02 NOTE — PROGRESS NOTES
Josue Adam is a 64year old female.   HPI:   Virtual Video/Telephone Check-In    Josue Adam verbally consents to a video visit on 7/2/21    Patient understands and accepts financial responsibility for any deductible, co-insuran Dispense Refill   • Sulfamethoxazole-TMP DS (BACTRIM DS) 800-160 MG Oral Tab per tablet Take 1 tablet by mouth 2 (two) times daily for 10 days.  20 tablet 0   • MELOXICAM 15 MG Oral Tab TAKE 1 TABLET(15 MG) BY MOUTH DAILY AS NEEDED 90 tablet 0   • VITAMIN D Past Surgical History:   Procedure Laterality Date   • CHOLECYSTECTOMY     • HERNIA SURGERY      supraumbilical   • HYSTERECTOMY      ovaries still in   • WILFREDO LOCALIZATION WIRE 1 SITE LEFT (CPT=19281)  2 biopsies   • WILFREDO LOCALIZATION WIRE 1 SITE RIGHT times daily for 10 days.    -certainly are signs of mild cellulitis now, though the hx of purulent drainage, significant pain and black discoloration concerning for cellulitis with + abscess or osteo infection possibly with PAD; patient is certain it looks

## 2021-07-02 NOTE — TELEPHONE ENCOUNTER
Routing to provider per protocol.   gabapentin 400 MG Oral Cap    Last refilled on 1/29/21 for #120  with 2 rf. Last labs 7/10/20. Last seen on 3/3/20.      Future Appointments   Date Time Provider Mago Lisa   7/2/2021  3:00 PM Margi Rogel MD

## 2021-07-06 RX ORDER — GABAPENTIN 400 MG/1
CAPSULE ORAL
Qty: 120 CAPSULE | Refills: 2 | OUTPATIENT
Start: 2021-07-06

## 2021-07-13 ENCOUNTER — TELEPHONE (OUTPATIENT)
Dept: FAMILY MEDICINE CLINIC | Facility: CLINIC | Age: 61
End: 2021-07-13

## 2021-07-13 NOTE — TELEPHONE ENCOUNTER
PT CALLED TO ADV THAT SHE IS ADMITTED IN AT  [facility], SOMETHING TO DO WITH HER FEET. SHE WANTED TO LET THE DR KNOW THAT SHE DID GET AN ECHO AND BLOOD WORK DONE.     Milo Luis

## 2021-07-15 ENCOUNTER — TELEPHONE (OUTPATIENT)
Dept: FAMILY MEDICINE CLINIC | Facility: CLINIC | Age: 61
End: 2021-07-15

## 2021-07-15 NOTE — TELEPHONE ENCOUNTER
FYI:    DR EPPERSON CALLED TO ADV THAT PT AT [facility]. WAS BEING TREATED FOR SYNCOPE EPISODES - MRI SHOWED SEIZURES AND WAS PLACED ON KEPPRA 1000MG 2 X'S A DAY. PLACED ON NO DRIVING FOR 6 MONTHS. F/U NEURO IN 5 WEEKS.     CARDIAC TESTING ALL CAME BACK

## 2021-07-20 ENCOUNTER — TELEPHONE (OUTPATIENT)
Dept: FAMILY MEDICINE CLINIC | Facility: CLINIC | Age: 61
End: 2021-07-20

## 2021-07-20 NOTE — TELEPHONE ENCOUNTER
JENNY FROM [facility] CALLED AND ADV THAT SHE HAS NOT BEEN ABLE TO GET A HOLD OF PT. WAS ADV BY PT THAT SHE WAS GOING DOWN TO [place], AND WAS SUPPOSE TO RETURN THIS WEEK. [facility] WANTED TO LET  BE AWARE.     Milo Luis

## 2021-07-20 NOTE — TELEPHONE ENCOUNTER
Noted, thanks. Brijesh Aviles, can you make a note to yourself to try reaching out to patient later this week?   Thanks

## 2021-07-21 RX ORDER — CYCLOBENZAPRINE HCL 10 MG
TABLET ORAL
Qty: 60 TABLET | Refills: 0 | Status: SHIPPED | OUTPATIENT
Start: 2021-07-21 | End: 2021-09-20

## 2021-07-23 RX ORDER — TRAMADOL HYDROCHLORIDE 50 MG/1
TABLET ORAL
COMMUNITY
Start: 2020-02-14 | End: 2021-10-08

## 2021-07-23 RX ORDER — ZINC GLUCONATE 50 MG
TABLET ORAL
COMMUNITY

## 2021-07-23 RX ORDER — SULFAMETHOXAZOLE AND TRIMETHOPRIM 800; 160 MG/1; MG/1
1 TABLET ORAL
COMMUNITY

## 2021-07-23 RX ORDER — BACITRACIN, NEOMYCIN, POLYMYXIN B 400; 3.5; 5 [USP'U]/G; MG/G; [USP'U]/G
OINTMENT TOPICAL 3 TIMES DAILY
COMMUNITY
Start: 2021-07-15

## 2021-07-23 RX ORDER — ROPINIROLE 4 MG/1
2 TABLET, FILM COATED ORAL NIGHTLY
COMMUNITY
Start: 2020-02-19

## 2021-07-23 RX ORDER — LEVETIRACETAM 1000 MG/1
1000 TABLET ORAL 2 TIMES DAILY
COMMUNITY
Start: 2021-07-15

## 2021-07-23 RX ORDER — GABAPENTIN 400 MG/1
800 CAPSULE ORAL
COMMUNITY
Start: 2019-10-31 | End: 2021-11-03

## 2021-07-23 RX ORDER — MULTIVIT WITH MINERALS/LUTEIN
TABLET ORAL DAILY
COMMUNITY

## 2021-07-23 NOTE — TELEPHONE ENCOUNTER
Spoke with patient and she is feeling better. Will be going to [facility] Saturday but will be back in time for apt.

## 2021-07-27 RX ORDER — CYCLOBENZAPRINE HCL 10 MG
TABLET ORAL
Qty: 60 TABLET | Refills: 0 | OUTPATIENT
Start: 2021-07-27

## 2021-07-27 RX ORDER — TRAMADOL HYDROCHLORIDE 50 MG/1
TABLET ORAL
Qty: 60 TABLET | Refills: 0 | Status: SHIPPED | OUTPATIENT
Start: 2021-07-27 | End: 2022-01-08

## 2021-07-27 NOTE — TELEPHONE ENCOUNTER
Last refilled on 06/27/2021 for # 60 with 0 rf. Cyclobenaprine. Last refilled on 06/27/2021 for # 60 with 0 rf. for tramadol 50mg. Last seen on 07/02/2021. Thank you.

## 2021-08-07 ENCOUNTER — TELEPHONE (OUTPATIENT)
Dept: FAMILY MEDICINE CLINIC | Facility: CLINIC | Age: 61
End: 2021-08-07

## 2021-08-07 NOTE — TELEPHONE ENCOUNTER
If she has gangrene, antibiotics are not going to help this, she needs to go to and ER and be evaluated

## 2021-08-07 NOTE — TELEPHONE ENCOUNTER
Spoke to pt advising DS stated that she should get an x-ray. Pt understood. She stated that since she doesn't trust the dr's out there that she is going to wait until she gets back home. Let her know [address] is back in office Monday if she wants to talk to her.

## 2021-08-07 NOTE — TELEPHONE ENCOUNTER
PT CALLED AND WANTED ME TO GET HOLD OF DR LUIS MOHR. PT FINISHED A ROUND OF ANTIBIOTICS 5 DAYS AGO. PT STATES SHE HAS GANGRENE IN HE TOES AND NEEDS THE ANTIBIOTIC FILLED AGAIN ASAP.      [address] [facility]

## 2021-08-07 NOTE — TELEPHONE ENCOUNTER
Pt stated her toes are swollen due to her hitting her foot on her car late Wednesday night. She was wearing sandals and is now bruised. No discharge except bleeding from when she hit her foot. Today her foot hurts to the touch.  She said [address] advised her to c

## 2021-08-10 ENCOUNTER — MED REC SCAN ONLY (OUTPATIENT)
Dept: FAMILY MEDICINE CLINIC | Facility: CLINIC | Age: 61
End: 2021-08-10

## 2021-08-10 ENCOUNTER — TELEPHONE (OUTPATIENT)
Dept: FAMILY MEDICINE CLINIC | Facility: CLINIC | Age: 61
End: 2021-08-10

## 2021-08-10 NOTE — TELEPHONE ENCOUNTER
No way donavon, she was hospitalized with infection previously, she needs to be seen in person and evaluated, go to [facility] or ER in [place]

## 2021-08-10 NOTE — TELEPHONE ENCOUNTER
PT called she in [place]. She said the infection in her feet is coming back again. She is wanting to know if the doctor could send in another antibiotic?      Pt would like sent to   [facility] [address] [place] [address] [address] [facility]

## 2021-08-10 NOTE — TELEPHONE ENCOUNTER
Patient states that both feet toes are starting to get red and crack open. Patient states that feet are not black anymore just red and starting to split open a bit.   Some of patients toes are looking not so good but the middle toe on right foot is looking

## 2021-08-16 ENCOUNTER — TELEPHONE (OUTPATIENT)
Dept: FAMILY MEDICINE CLINIC | Facility: CLINIC | Age: 61
End: 2021-08-16

## 2021-08-17 ENCOUNTER — TELEPHONE (OUTPATIENT)
Dept: FAMILY MEDICINE CLINIC | Facility: CLINIC | Age: 61
End: 2021-08-17

## 2021-08-17 NOTE — TELEPHONE ENCOUNTER
Future Appointments   Date Time Provider Mago Gonzalez   8/18/2021  9:30 AM Bushra Welsh MD [facility] SAMANTHA Triplett

## 2021-08-17 NOTE — TELEPHONE ENCOUNTER
PT CALLED TO SPEAK WITH RN ABOUT HER FEET. PT ALSO WANTS TO SCHEDULE HER ANNUAL VISIT ASAP. PT WAS A NO SHOW FOR YESTERDAYS APPT. I DO NOT SEE ANY OPENINGS WITH DR PANG.     PLEASE ADVISE

## 2021-08-20 DIAGNOSIS — G25.81 RESTLESS LEGS SYNDROME (RLS): Primary | ICD-10-CM

## 2021-08-20 RX ORDER — VENLAFAXINE HYDROCHLORIDE 150 MG/1
CAPSULE, EXTENDED RELEASE ORAL
Qty: 90 CAPSULE | Refills: 0 | Status: SHIPPED | OUTPATIENT
Start: 2021-08-20 | End: 2021-11-27

## 2021-08-20 NOTE — TELEPHONE ENCOUNTER
Medication: ROPINIROLE HCL 4 MG Oral Tab    Date of last refill: 03/08/2021 (#90/0)  [place]    Last office visit: 01/06/2021  Due back to clinic per last office note:  3-6 months  Date next office visit scheduled:

## 2021-08-23 RX ORDER — ROPINIROLE 4 MG/1
TABLET, FILM COATED ORAL
Qty: 90 TABLET | Refills: 0 | Status: SHIPPED | OUTPATIENT
Start: 2021-08-23 | End: 2021-11-23

## 2021-08-27 ENCOUNTER — TELEPHONE (OUTPATIENT)
Dept: FAMILY MEDICINE CLINIC | Facility: CLINIC | Age: 61
End: 2021-08-27

## 2021-09-20 RX ORDER — PROPRANOLOL HYDROCHLORIDE 60 MG/1
60 TABLET ORAL 2 TIMES DAILY
Qty: 180 TABLET | Refills: 0 | Status: SHIPPED | OUTPATIENT
Start: 2021-09-20 | End: 2022-01-27

## 2021-09-20 RX ORDER — LEVOTHYROXINE SODIUM 0.07 MG/1
75 TABLET ORAL
Qty: 90 TABLET | Refills: 0 | Status: SHIPPED | OUTPATIENT
Start: 2021-09-20 | End: 2021-10-27

## 2021-09-20 RX ORDER — CYCLOBENZAPRINE HCL 10 MG
10 TABLET ORAL 2 TIMES DAILY PRN
Qty: 60 TABLET | Refills: 0 | Status: SHIPPED | OUTPATIENT
Start: 2021-09-20 | End: 2021-10-08

## 2021-09-20 RX ORDER — MELOXICAM 15 MG/1
15 TABLET ORAL
Qty: 90 TABLET | Refills: 0 | Status: SHIPPED | OUTPATIENT
Start: 2021-09-20

## 2021-09-20 NOTE — TELEPHONE ENCOUNTER
Routing to provider per protocol. MELOXICAM 15 MG Oral Tab  Last refilled on 6/28/21 for #90  with 0 rf. CYCLOBENZAPRINE 10 MG Oral Tab  Last refilled on 7/21/21 for #60 with 0 rf.     LEVOTHYROXINE SODIUM 75 MCG Oral Tab  Last refilled on 6/2/21 for

## 2021-09-22 ENCOUNTER — TELEPHONE (OUTPATIENT)
Dept: FAMILY MEDICINE CLINIC | Facility: CLINIC | Age: 61
End: 2021-09-22

## 2021-10-08 RX ORDER — CYCLOBENZAPRINE HCL 10 MG
10 TABLET ORAL 2 TIMES DAILY PRN
Qty: 60 TABLET | Refills: 0 | Status: SHIPPED | OUTPATIENT
Start: 2021-10-08 | End: 2021-11-22

## 2021-10-08 RX ORDER — TRAMADOL HYDROCHLORIDE 50 MG/1
50 TABLET ORAL 2 TIMES DAILY PRN
Qty: 60 TABLET | Refills: 0 | Status: SHIPPED | OUTPATIENT
Start: 2021-10-08 | End: 2021-11-07

## 2021-10-08 NOTE — TELEPHONE ENCOUNTER
Routing to provider per protocol. cyclobenzaprine 10 MG Oral Tab  Last refilled on 9/20/21 for #60  with 0 rf. TRAMADOL 50 MG Oral Tab  Last refilled on 7/27/21 for #60  with 0 rf        Last labs 7/10/21.    Last seen on 7/2/21 telemedicine w/Dr. Tay Arndt

## 2021-10-15 ENCOUNTER — TELEPHONE (OUTPATIENT)
Dept: FAMILY MEDICINE CLINIC | Facility: CLINIC | Age: 61
End: 2021-10-15

## 2021-10-15 DIAGNOSIS — Z12.31 SCREENING MAMMOGRAM FOR BREAST CANCER: Primary | ICD-10-CM

## 2021-10-15 NOTE — TELEPHONE ENCOUNTER
Pt called would like to have a mammogram     Please place order, thank you.     Pt scheduled annual medicare   Future Appointments   Date Time Provider Mago Gonzalez   11/24/2021  3:00 PM Clifton Pisano MD [facility] Yao Corado

## 2021-10-27 RX ORDER — LEVOTHYROXINE SODIUM 0.07 MG/1
TABLET ORAL
Qty: 90 TABLET | Refills: 0 | Status: SHIPPED | OUTPATIENT
Start: 2021-10-27

## 2021-10-27 NOTE — TELEPHONE ENCOUNTER
Thyroid Supplements Protocol Failed 10/27/2021 03:46 PM   Protocol Details  TSH test in past 12 months    TSH value between 0.350 and 5.500 IU/ml    Appointment in past 12 or next 3 months     Last refilled on 09/20/2021 for # 90 with 0 rf.    Last labs 07/

## 2021-11-03 RX ORDER — GABAPENTIN 400 MG/1
800 CAPSULE ORAL 2 TIMES DAILY
Qty: 120 CAPSULE | Refills: 0 | Status: SHIPPED | OUTPATIENT
Start: 2021-11-03 | End: 2021-12-03

## 2021-11-22 RX ORDER — CYCLOBENZAPRINE HCL 10 MG
TABLET ORAL
Qty: 60 TABLET | Refills: 0 | OUTPATIENT
Start: 2021-11-22

## 2021-11-22 RX ORDER — CYCLOBENZAPRINE HCL 10 MG
TABLET ORAL
Qty: 60 TABLET | Refills: 0 | Status: SHIPPED | OUTPATIENT
Start: 2021-11-22 | End: 2021-12-21

## 2021-11-22 NOTE — TELEPHONE ENCOUNTER
Routing to provider per protocol. Last refilled on 10/8/21 for # 60 with 0 rf. Last seen on 7/2/21 via Telemedicine.      Future Appointments   Date Time Provider Mago Gonzalez   11/24/2021  3:00 PM Clifton Kelly MD [facility] SAMANTHA Beckett

## 2021-11-23 DIAGNOSIS — G25.81 RESTLESS LEGS SYNDROME (RLS): ICD-10-CM

## 2021-11-23 RX ORDER — CYCLOBENZAPRINE HCL 10 MG
TABLET ORAL
Qty: 60 TABLET | Refills: 0 | OUTPATIENT
Start: 2021-11-23

## 2021-11-23 RX ORDER — ROPINIROLE 4 MG/1
TABLET, FILM COATED ORAL
Qty: 90 TABLET | Refills: 0 | Status: SHIPPED | OUTPATIENT
Start: 2021-11-23

## 2021-11-23 NOTE — TELEPHONE ENCOUNTER
Medication: ROPINIROLE HCL 4 MG Oral Tab     Date of last refill: 08/23/2021 (#90/0)  [place]     Last office visit: 01/06/2021  Due back to clinic per last office note:  Around 07/06/2021  Date next office visit hanh

## 2021-11-27 RX ORDER — VENLAFAXINE HYDROCHLORIDE 150 MG/1
150 CAPSULE, EXTENDED RELEASE ORAL DAILY
Qty: 90 CAPSULE | Refills: 0 | Status: SHIPPED | OUTPATIENT
Start: 2021-11-27

## 2021-11-27 NOTE — TELEPHONE ENCOUNTER
VENLAFAXINE 150 MG Oral Capsule SR 24 Hr    PT NEEDS REFILL ON THIS MEDICATION      [id number] [place], [place] - [address] AT [facility] [address], [phone], [address] [facility]

## 2021-11-27 NOTE — TELEPHONE ENCOUNTER
LOV:  7/2/21 Telemed  Last Refill:8/20/21 #90 0 RF    Future Appointments   Date Time Provider Mago Gonzalez   12/17/2021 11:00 AM Sharifa Chappell MD [facility]

## 2021-12-03 RX ORDER — GABAPENTIN 400 MG/1
CAPSULE ORAL
Qty: 120 CAPSULE | Refills: 3 | Status: SHIPPED | OUTPATIENT
Start: 2021-12-03

## 2021-12-03 NOTE — TELEPHONE ENCOUNTER
Protocol: none  Last refilled 11/3/21 for #120 with 0 RF  LV with [address] telemed 7/2/21  Future appt with MM 12/17/21

## 2021-12-21 RX ORDER — CYCLOBENZAPRINE HCL 10 MG
TABLET ORAL
Qty: 60 TABLET | Refills: 0 | Status: SHIPPED | OUTPATIENT
Start: 2021-12-21

## 2022-01-08 RX ORDER — TRAMADOL HYDROCHLORIDE 50 MG/1
TABLET ORAL
Qty: 60 TABLET | Refills: 0 | Status: SHIPPED | OUTPATIENT
Start: 2022-01-08

## 2022-01-08 NOTE — TELEPHONE ENCOUNTER
Routing to provider per protocol. Last refilled on 7/27/21 for # 60 with 0 rf. Last seen on 7/2/21 via Telemedicine.      Future Appointments   Date Time Provider Mago Gonzalez   1/27/2022  3:20 PM Clifton Solis MD [facility] SAMANTHA Angela

## 2022-01-27 RX ORDER — PROPRANOLOL HYDROCHLORIDE 60 MG/1
TABLET ORAL
Qty: 180 TABLET | Refills: 0 | Status: SHIPPED | OUTPATIENT
Start: 2022-01-27

## 2022-01-27 NOTE — TELEPHONE ENCOUNTER
Hypertension Medications Protocol Failed 01/27/2022 03:31 AM   Protocol Details  CMP or BMP in past 12 months    Last serum creatinine< 2.0    Appointment in past 6 or next 3 months     Routing to provider per protocol.     Last refilled on 9/20/21 for # 18

## 2022-02-08 RX ORDER — ROPINIROLE 4 MG/1
TABLET, FILM COATED ORAL
Qty: 30 TABLET | Refills: 0 | Status: SHIPPED | OUTPATIENT
Start: 2022-02-08 | End: 2022-03-09

## 2022-02-09 ENCOUNTER — TELEPHONE (OUTPATIENT)
Dept: FAMILY MEDICINE CLINIC | Facility: CLINIC | Age: 62
End: 2022-02-09

## 2022-02-15 RX ORDER — ROPINIROLE 4 MG/1
TABLET, FILM COATED ORAL
Qty: 90 TABLET | Refills: 0 | OUTPATIENT
Start: 2022-02-15

## 2022-02-18 ENCOUNTER — TELEPHONE (OUTPATIENT)
Dept: FAMILY MEDICINE CLINIC | Facility: CLINIC | Age: 62
End: 2022-02-18

## 2022-02-18 NOTE — TELEPHONE ENCOUNTER
Pt had an apt today for medicare and had to cancel granddaughter sick and she is not feeling well       Pt states has an infection in her mouth and can not see a dentist because she is sick       Pt did not want to schedule video visit but does want to be seen or have medication sent over       Pt call back # [phone]    Thank you

## 2022-02-18 NOTE — TELEPHONE ENCOUNTER
Advised by Tyler Six will not cover dental diagnoses - may not cover appt    Left detailed message to voicemail (per verbal release form consent with confirmed identifying message) - advising that appt addressing dental issues may NOT be covered by [facility] insurance, advised appt NOT scheduled. Patient advised to call office back with any questions/concerns.     NEED TO CONFIRM CURRENT ADDRESS

## 2022-02-18 NOTE — TELEPHONE ENCOUNTER
Pt reports she has tooth infection    Back tooth - right upper molar - \"right before last one\"  Red/swollen, hurts \"like crazy\" - \"pus\" coming out    Had some other teeth pulled - Pt was on Amitriptyline - rots teeth - Discontinued - put on propranolol  \"too late to save teeth\"    March 2020 - pt \"crazy glued\" crown - last time pt saw dentist  Was supposed to see dentist again - Right before COVID shutdown  Has not been able to see dentist due to sickness    Pt requesting video visit tomorrow -  Dr. Polo Ormond in office tomorrow  Pt advised will need to verify with Dr. Polo Ormond if okay to schedule video visit - office will call pt back once Dr. Polo Ormond responds    Please advise, thank you  (routing to Dr. Goran Gonzalez)  (Dr. Moustapha Vanessa aware of note above)

## 2022-02-19 NOTE — TELEPHONE ENCOUNTER
Thyroid Supplements Protocol Failed 02/19/2022 03:11 AM   Protocol Details  TSH test in past 12 months    TSH value between 0.350 and 5.500 IU/ml    Appointment in past 12 or next 3 months        LOV: 7/2/21 Telemed [address]  Last Refill: 10/27/21 #90 0 RF    Lab Results   Component Value Date    T4F 1.0 08/28/2019    TSH 0.978 07/10/2020

## 2022-03-09 RX ORDER — LEVOTHYROXINE SODIUM 0.07 MG/1
TABLET ORAL
Qty: 90 TABLET | Refills: 0 | OUTPATIENT
Start: 2022-03-09

## 2022-03-09 RX ORDER — PROPRANOLOL HYDROCHLORIDE 60 MG/1
TABLET ORAL
Qty: 180 TABLET | Refills: 0 | OUTPATIENT
Start: 2022-03-09

## 2022-03-09 RX ORDER — VENLAFAXINE HYDROCHLORIDE 150 MG/1
CAPSULE, EXTENDED RELEASE ORAL
Qty: 90 CAPSULE | Refills: 0 | OUTPATIENT
Start: 2022-03-09

## 2022-03-10 RX ORDER — ROPINIROLE 4 MG/1
TABLET, FILM COATED ORAL
Qty: 90 TABLET | Refills: 0 | Status: SHIPPED | OUTPATIENT
Start: 2022-03-10

## 2022-03-21 RX ORDER — CYCLOBENZAPRINE HCL 10 MG
TABLET ORAL
Qty: 60 TABLET | Refills: 0 | OUTPATIENT
Start: 2022-03-21

## 2022-03-21 RX ORDER — LEVOTHYROXINE SODIUM 0.07 MG/1
TABLET ORAL
Qty: 90 TABLET | Refills: 0 | OUTPATIENT
Start: 2022-03-21

## 2022-03-21 RX ORDER — VENLAFAXINE HYDROCHLORIDE 150 MG/1
CAPSULE, EXTENDED RELEASE ORAL
Qty: 90 CAPSULE | Refills: 0 | OUTPATIENT
Start: 2022-03-21

## 2022-04-08 RX ORDER — GABAPENTIN 400 MG/1
CAPSULE ORAL
Qty: 120 CAPSULE | Refills: 3 | OUTPATIENT
Start: 2022-04-08

## 2022-04-08 NOTE — TELEPHONE ENCOUNTER
PT CALLED AND ADV REALLY NEEDS REFILLS ON HER MEDS. ADV PT THAT DR CAN NOT FILL MEDS UNTIL PT HAS BEEN SEEN IN THE OFFICE. PT ADV THAT SHE HAS NEW INSURANCE AND WE ARE NOT COVERED UNDER HER PLAN. OFFERED PT SELF PAY OPTIONS TO BE SEEN IN OFFICE AND WAS DECLINED.

## 2022-04-08 NOTE — TELEPHONE ENCOUNTER
Pt needs to be seen by pcp before refilling  Future appt with MM 4/11/22  Please see refill encounter from 3/9/22

## 2022-04-27 RX ORDER — GABAPENTIN 400 MG/1
CAPSULE ORAL
Qty: 120 CAPSULE | Refills: 2 | Status: SHIPPED | OUTPATIENT
Start: 2022-04-27

## 2022-04-29 RX ORDER — LEVOTHYROXINE SODIUM 0.07 MG/1
TABLET ORAL
Qty: 90 TABLET | Refills: 0 | OUTPATIENT
Start: 2022-04-29

## 2022-05-18 RX ORDER — VENLAFAXINE HYDROCHLORIDE 150 MG/1
CAPSULE, EXTENDED RELEASE ORAL
Qty: 90 CAPSULE | Refills: 0 | OUTPATIENT
Start: 2022-05-18

## 2022-07-16 DIAGNOSIS — G25.81 RESTLESS LEGS SYNDROME (RLS): ICD-10-CM

## 2022-07-18 NOTE — TELEPHONE ENCOUNTER
Refused Medication: ROPINIROLE HCL 4 MG Oral Tab     LOV 1/6/21- f/u appt required     Sent pt message via [employer]

## 2022-07-21 RX ORDER — ROPINIROLE 4 MG/1
TABLET, FILM COATED ORAL
Qty: 90 TABLET | Refills: 0 | OUTPATIENT
Start: 2022-07-21

## 2022-07-21 NOTE — TELEPHONE ENCOUNTER
Patient review message from Yovanny explaining the need for an office visit. Medication withheld at this time until an office visit is made.

## 2022-07-22 ENCOUNTER — PATIENT MESSAGE (OUTPATIENT)
Dept: NEUROLOGY | Facility: CLINIC | Age: 62
End: 2022-07-22

## 2022-07-22 NOTE — TELEPHONE ENCOUNTER
From: Cara Kenney  Sent: 7/22/2022 6:36 AM CDT  To: Sarah Khan Nurse  Subject: Refill Request    And this is really getting ridiculous. Pharmacy got a hold of me on this refill and told me that it had been denied by the doctor. I told them to not worry about it because that was from an old prescription with an old doctor that I no longer see. I told them multiple times just leave it alone that I would have a new doctor a new prescription with new refills on the 20th of July with my new insurance. For whatever reason Carlene couldn't get it through their head that my insurance is switched therefore my PC had switched and there was no way it was going to get refills on it until I actually had my first appointment with my new doctor. I got flagged I can't tell you how many times on this one I called them multiple times argued with them over it and they said well we'll just put it in again and then when it gets denied again then we'll leave it alone. Obviously that didn't happen. This is a medication that I do need but not right now. I had enough to get me through my wait to see   my new pc. So you're confusing me because you're denying it or you're canceling it and never will refill it? Please let me know how this is going to work out because I will need this refill at a later date, as in about another week or so. I only wish that you could stop them sooner and knowing I hadn't actually seen my new doctor yet.

## 2022-08-15 DIAGNOSIS — M79.7 FIBROMYALGIA: ICD-10-CM

## 2022-10-05 RX ORDER — GABAPENTIN 400 MG/1
CAPSULE ORAL
Qty: 120 CAPSULE | Refills: 2 | OUTPATIENT
Start: 2022-10-05

## 2023-09-12 NOTE — PROGRESS NOTES
Gold and mint tubes drawn from left arm with 23g butterfly with needle x1. Pt cinthia well.  Pt left the clinic in stable condition Adult

## (undated) DIAGNOSIS — G25.81 RESTLESS LEGS SYNDROME (RLS): ICD-10-CM

## (undated) DIAGNOSIS — M79.7 FIBROMYALGIA: ICD-10-CM

## (undated) DEVICE — KENDALL SCD EXPRESS SLEEVES, KNEE LENGTH, MEDIUM: Brand: KENDALL SCD

## (undated) DEVICE — SUTURE ETHIBOND EXCEL 2-0 SH

## (undated) DEVICE — SOL  .9 1000ML BTL

## (undated) DEVICE — TRANSPOSAL ULTRAFLEX DUO/QUAD ULTRA CART MANIFOLD

## (undated) DEVICE — SUTURE VICRYL 3-0 SH

## (undated) DEVICE — STERILE SYNTHETIC POLYISOPRENE POWDER-FREE SURGICAL GLOVES WITH HYDROGEL COATING: Brand: PROTEXIS

## (undated) DEVICE — 3M™ TEGADERM™ TRANSPARENT FILM DRESSING, 1626W, 4 IN X 4-3/4 IN (10 CM X 12 CM), 50 EACH/CARTON, 4 CARTON/CASE: Brand: 3M™ TEGADERM™

## (undated) DEVICE — GAUZE SPONGES,USP TYPE VII GAUZE, 12 PLY: Brand: CURITY

## (undated) DEVICE — LIGHT HANDLE

## (undated) DEVICE — LAPAROTOMY CDS: Brand: MEDLINE INDUSTRIES, INC.

## (undated) DEVICE — VIOLET BRAIDED (POLYGLACTIN 910), SYNTHETIC ABSORBABLE SUTURE: Brand: COATED VICRYL

## (undated) NOTE — MR AVS SNAPSHOT
[address] [id number]               Thank you for choosing us for your health care visit with David Estrada MD.  We are glad to serve you and happy to provide you with this ? Patient must present photo ID at time of . If a designated family member will be picking up prescription, office must be given name of individual in advance and they must present an ID as well. ?  The name of the person picking up your prescripti alprazolam 0.5 MG Tabs   Take 1 tablet by mouth 3 (three) times daily as needed for Sleep or Anxiety. Commonly known as:  XANAX           AMITRIPTYLINE HCL   Take 50 mg by mouth every evening.            aspirin 81 MG Chew   Chew 1 tablet (81 mg

## (undated) NOTE — Clinical Note
Peter Simon saw Christy Her in the office. She has a recurrent ventral hernia at the upper midline of her abdomen. I am planning ventral herniorrhaphy with mesh. We will schedule at the next available date.   Unfortunately due to the current been an elective villalta

## (undated) NOTE — LETTER
01/13/20  [address] [id number]      Dear Lucho Mai.     To help us provide the highest quality medical care, [facility] uses a sophisticated computer system to track our sanjana

## (undated) NOTE — LETTER
03/28/19        Iris North  [address] [address]      Dear Tamera Dill,    Our records indicate that you have outstanding lab work and or testing that was ordered for you and has not yet been completed:  Lab Frequency Next Occ

## (undated) NOTE — MR AVS SNAPSHOT
After Visit Summary   1/2/2017    Luisana Chloé    MRN: [id number]           Visit Information        Provider Department Dept Phone    1/2/2017  8:00 PM Bed1  [facility] [phone]      Allergies as of 1/2/2017  Reviewed on: 9/19/ will help us do so. For more information on [employer], please visit [email or web address]

## (undated) NOTE — LETTER
08/23/21    BECCA [address]    Dear Patient,  [facility] need to inform you that you have not kept your healthcare appointments.   Our policy is to notify you when we begin to

## (undated) NOTE — LETTER
08/13/19        Iris Shrestha  [address] [facility]      Dear Manuel Currie records indicate that you have outstanding lab work and or testing that was ordered for you and has not yet been completed:  Lab Frequency Next Occurrenc